# Patient Record
Sex: FEMALE | Race: WHITE
[De-identification: names, ages, dates, MRNs, and addresses within clinical notes are randomized per-mention and may not be internally consistent; named-entity substitution may affect disease eponyms.]

---

## 2020-10-27 ENCOUNTER — HOSPITAL ENCOUNTER (EMERGENCY)
Dept: HOSPITAL 97 - ER | Age: 55
Discharge: HOME | End: 2020-10-27
Payer: COMMERCIAL

## 2020-10-27 VITALS — SYSTOLIC BLOOD PRESSURE: 141 MMHG | TEMPERATURE: 98.5 F | DIASTOLIC BLOOD PRESSURE: 69 MMHG

## 2020-10-27 VITALS — OXYGEN SATURATION: 99 %

## 2020-10-27 DIAGNOSIS — B27.90: Primary | ICD-10-CM

## 2020-10-27 PROCEDURE — 36415 COLL VENOUS BLD VENIPUNCTURE: CPT

## 2020-10-27 PROCEDURE — 86308 HETEROPHILE ANTIBODY SCREEN: CPT

## 2020-10-27 PROCEDURE — 99281 EMR DPT VST MAYX REQ PHY/QHP: CPT

## 2020-10-27 PROCEDURE — 87804 INFLUENZA ASSAY W/OPTIC: CPT

## 2020-10-27 NOTE — XMS REPORT
Clinical Summary

                           Created on:2020



Patient:Fanta Davis

Sex:Female

:1965

External Reference #:[id number]





Demographics







                          Address                   [address] [place] [address]

 

                          Home Phone                [phone]

 

                          Email Address             [email or web address]

 

                          Preferred Language        English

 

                          Marital Status            

 

                          Jain Affiliation     Unknown

 

                          Race                      White

 

                          Ethnic Group              Not  or 









Author







                          Organization              [facility] [employer]

 

                          Address                   [address] [place] [address]









Support







                Name            Relationship    Address         Phone

 

                Taz Davis    Spouse          Unavailable     [phone]









Care Team Providers







                    Name                Role                Phone

 

                    Asked, No Pcp       Primary Care Provider Unavailable









Allergies

Not on File



Medications

No known medications



Active Problems

Not on file



Social History







             Tobacco Use  Types        Packs/Day    Years Used   Date

 

             Never Assessed                                        









                Smokeless Tobacco: Never Used                                 









                          Sex Assigned at Birth     Date Recorded

 

                          Not on file               







Last Filed Vital Signs

Not on file



Plan of Treatment







                Health Maintenance Due Date        Last Done       Comments

 

                CERVICAL CANCER SCREENING 1987                      

 

                BREAST CANCER SCREENING 2016                      

 

                COLONOSCOPY SCREENING 2016                      

 

                SHINGLES VACCINES (#1) 2016                      

 

                INFLUENZA VACCINE 2020                      







Results

Not on fileafter 10/27/2019



Insurance







          Payer     Benefit Plan / Group Subscriber ID Effective Dates Phone    

 Address   Type

 

          [employer]     ALLIED/[employer] SIG ADMIN [id number] 10/2/2018-Present          

           PPO

 

          [employer]     [employer] SIG ADMIN PPO [id number] 2018-Present              

       PPO









           Guarantor Name Account Type Relation to Date of Birth Phone      Bill

ing



                                 Patient                          Address

 

           Fanta Davis Personal/Family Self       1965 [phone] [address]

[facility]



                                                       ([place])     [place] [id number]







Advance Directives

For more information, please contact: [phone]





                Type            Date Recorded   Patient Representative Explanati

on

 

                Advance Directives, Living 2018  5:46 PM                 



                Will and Medical Power of

## 2020-10-27 NOTE — ER
Nurse's Notes                                                                                     

 [facility]                                                                 

Name: Fanta Davis                                                                                

Age: 54 yrs                                                                                       

Sex: Female                                                                                       

: 1965                                                                                   

MRN: [id number]                                                                                   

Arrival Date: 10/27/2020                                                                          

Time: 10:19                                                                                       

Account#: [id number]                                                                            

Bed 15                                                                                            

Private MD:                                                                                       

Diagnosis: Infectious mononucleosis                                                               

                                                                                                  

Presentation:                                                                                     

10/27                                                                                             

10:35 Chief complaint: Patient states: headache, bodyaches, fever last night, daughter        iw  

      diagnosed with mono recently, daughter was covid negative. Coronavirus screen: fever,       

      headache, muscle pain. Ebola Screen: Patient negative for fever greater than or equal       

      to 101.5 degrees Fahrenheit, and additional compatible Ebola Virus Disease symptoms         

      Patient denies exposure to infectious person. Patient denies travel to an                   

      Ebola-affected area in the 21 days before illness onset. No symptoms or risks               

      identified at this time. Initial Sepsis Screen: Does the patient meet any 2 criteria?       

      No. Patient's initial sepsis screen is negative. Does the patient have a suspected          

      source of infection? No. Patient's initial sepsis screen is negative. Risk Assessment:      

      Do you want to hurt yourself or someone else? Patient reports no desire to harm self or     

      others. Onset of symptoms was 2020.                                             

10:35 Method Of Arrival: Ambulatory                                                           iw  

10:35 Acuity: SIMRAN 3                                                                           iw  

                                                                                                  

Triage Assessment:                                                                                

10:40 General: Appears in no apparent distress. uncomfortable, Behavior is cooperative,       bp  

      appropriate for age, anxious. Pain: Complains of pain in SORE THROAT. EENT: Throat is       

      reddened. Neuro: No deficits noted. Cardiovascular: No deficits noted. Respiratory: No      

      deficits noted. GI: No signs and/or symptoms were reported involving the                    

      gastrointestinal system. : No signs and/or symptoms were reported regarding the           

      genitourinary system. Derm: No deficits noted. Musculoskeletal: No deficits noted.          

                                                                                                  

Historical:                                                                                       

- Allergies:                                                                                      

10:36 No Known Allergies;                                                                     iw  

- Home Meds:                                                                                      

10:36 None [Active];                                                                          iw  

- PMHx:                                                                                           

10:36 None;                                                                                   iw  

- PSHx:                                                                                           

10:36 ;                                                                              iw  

                                                                                                  

- Immunization history:: Adult Immunizations not up to date.                                      

- Social history:: Smoking status: Patient denies any tobacco usage or history of.                

                                                                                                  

                                                                                                  

Screening:                                                                                        

10:43 Abuse screen: Denies threats or abuse. Denies injuries from another. Nutritional        bp  

      screening: No deficits noted. Tuberculosis screening: No symptoms or risk factors           

      identified. Fall Risk None identified.                                                      

                                                                                                  

Assessment:                                                                                       

10:40 General: SEE TRIAGE NOTE.                                                               bp  

12:00 Reassessment: PT D/C HOME AMBULATORY WITH FAMILY, DX WITH MONO.                         bp  

                                                                                                  

Vital Signs:                                                                                      

10:35  / 75; Pulse 71; Resp 16; Temp 98.8; Pulse Ox 99% on R/A; Weight 81.65 kg; Height iw  

      5 ft. 2 in. (157.48 cm);                                                                    

12:00  / 69; Pulse 75; Resp 17; Temp 98.5; Pulse Ox 99% ;                               bp  

10:35 Body Mass Index 32.92 (81.65 kg, 157.48 cm)                                             iw  

                                                                                                  

ED Course:                                                                                        

10:19 Patient arrived in ED.                                                                  ag5 

10:21 [facility]                                                        kb  

10:21 Chicho Glasgow MD is Attending Physician.                                             kb  

10:36 Triage completed.                                                                       iw  

10:36 Arm band placed on.                                                                     iw  

10:41 Fred Germain, RN is Primary Nurse.                                                    bp  

10:43 Patient has correct armband on for positive identification. Bed in low position. Call   bp  

      light in reach. Side rails up X2. Adult w/ patient.                                         

12:11 No provider procedures requiring assistance completed. Patient did not have IV access   bp  

      during this emergency room visit.                                                           

                                                                                                  

Administered Medications:                                                                         

No medications were administered                                                                  

                                                                                                  

                                                                                                  

Outcome:                                                                                          

11:56 Discharge ordered by MD.                                                                kb  

12:11 Discharged to home ambulatory, with family.                                             bp  

12:11 Condition: stable                                                                           

12:11 Discharge instructions given to patient, Instructed on discharge instructions, follow       

      up and referral plans. Demonstrated understanding of instructions, follow-up care.          

12:11 Patient left the ED.                                                                    bp  

                                                                                                  

Signatures:                                                                                       

Mary Worley FNP-C FNP-Morena Rodriguez, RN                     RN   iw                                                   

Fred Germain, RN                      RN   Albert Red                                ag5                                                  

                                                                                                  

Corrections: (The following items were deleted from the chart)                                    

10:40 10:35 Chief complaint: Patient states: headache, bodyaches, fever last night, daughter  iw  

      diagnosed with mono recently iw                                                             

                                                                                                  

**************************************************************************************************

## 2020-10-27 NOTE — XMS REPORT
Summary of Care

                           Created on:2020



Patient:Fanta Davis

Sex:Female

:1965

External Reference #:[id number]





Demographics







                          Address                   [address] [place] [address]

 

                          Mobile Phone              [phone]

 

                          Home Phone                [phone]

 

                          Email Address             [email or web address]

 

                          Preferred Language        English

 

                          Marital Status            

 

                          Mandaen Affiliation     Unknown

 

                          Race                      White

 

                          Ethnic Group              Not  or 









Author







                          Organization              [facility]

 

                          Address                   [address] [place] [address]









Support







                Name            Relationship    Address         Phone

 

                Taz Davis    Unavailable     Unavailable     [phone]









Care Team Providers







                    Name                Role                Phone

 

                    GILBERTO Gimenez          Primary Care Provider [phone]









Reason for Referral

 (Routine)





           Status     Reason     Specialty  Diagnoses / Referred By Referred To



                                            Procedures Contact    Contact

 

                New Request                     Family Medicine Diagnoses



Encounter for well woman exam with routine gynecological exam



Elevated BP without diagnosis of hypertension Glory Blanco,           Bao

g,



                                                                



Procedures



CONSULT/REFERRAL FAMILY MEDICINE



CONSULT/REFERRAL FAMILY MEDICINE        MD Gil MD







                                                       [address] [place] [address]



                                                                 Dr. Dr Camarillo 



                                        [facility] 







                                                       [place] [id number]







                                                       Phone:     Phone:



                                                                 [phone]







                                                       Fax:       Fax:



                                                       [phone]





 (Routine)





           Status     Reason     Specialty  Diagnoses / Referred By Referred To



                                            Procedures Contact    Contact

 

                New Request                                     Diagnoses



Encounter for screening colonoscopy     Glory Blanco MD Humphrey, Laurel,



                                                                



Procedures



CONSULT/REFERRAL GENERAL SURGERY [address] [place] [address]           MD Vallejo



                                        [facility] 



                                        [facility] [address]







                                                                 [address]



                                        [place], [place]



                                                       Phone:     [phone]



                                        Phone:



                                                       Fax: [phone]







                                                                  Fax: [phone]





Radiology Services (Routine)





           Status     Reason     Specialty  Diagnoses / Referred By Referred To



                                            Procedures Contact    Contact

 

                New Request                     Diagnostic      Diagnoses



Encounter for screening mammogram for malignant neoplasm of breast



Encounter for well woman exam with routine gynecological exam Glory Blanco,   

        



                                                Radiology       



Procedures



BI SCREENING MAMMOGRAM BILATERAL        MD



                                        



                                                       [address] [place] [address] 



                                                                 Dr. Camarillo 



                                        



                                                       [place] 



                                                                 [address]



                                        



                                                       Phone:     



                                                                 [phone]



                                        



                                                       Fax:       



                                                       [phone] 









Reason for Visit







                          Reason                    Comments

 

                          Well Woman Exam           







Encounter Details







             Date         Type         Department   Care Team    Description

 

                10/07/2020      Office Visit    [facility] Women's AdumGlory MD



                                        Encounter for well woman exam with damien gonzalez gynecological exam (Primary Dx);



                                                            [address]           Encounter for screening mamm

ogram for malignant neoplasm of 

breast;



                                                [address] [place] [address] DrLoree



                                        Encounter for screening colonoscopy;



                                                            Drive, [address]



                                        [address]



                                        Elevated BP without diagnosis of hyperte

nsion



                                       [place] 



                                                            [address]



                                        [id number] [phone] (Fax) 







Allergies

No Known Allergiesdocumented as of this encounter (statuses as of 10/08/2020)



Medications







          Medication Sig       Dispensed Refills   Start Date End Date  Status

 

          azelastine 137 mcg Use 1 Spray in each 30 mL     0         2020 

          Active



          (0.1 %) nasal nostril 2 (two)                                         



          sprayIndications: times daily. Use in                                 

        



          Sore throat, each nostril as                                         



          Post-nasal drip, directed                                          



          Cough                                                       



documented as of this encounter (statuses as of 10/08/2020)



Active Problems







                          Problem                   Noted Date

 

                          Sore throat               2020

 

                          Post-nasal drip           2020

 

                          Cough                     2020

 

                          Obesity (BMI 30-39.9)     2018

 

                          Family history of early CAD 2018



documented as of this encounter (statuses as of 10/08/2020)



Resolved Problems







                    Problem             Noted Date          Resolved Date

 

                    Chest pain          2018



documented as of this encounter (statuses as of 10/08/2020)



Social History







             Tobacco Use  Types        Packs/Day    Years Used   Date

 

             Never Smoker                                        









                Smokeless Tobacco: Never Used                                 









                Alcohol Use     Drinks/Week     oz/Week         Comments

 

                Yes                                             









                    Alcohol Habits      Answer              Date Recorded

 

                    How often do you have a drink containing alcohol? Monthly or

 less     10/07/2020

 

                    How many drinks containing alcohol do you have on a Not aske

d           10/07/2020



                    typical day when you are drinking?                     

 

                    How often do you have six or more drinks on one Not asked   

        10/07/2020



                    occasion?                               









                          Sex Assigned at Birth     Date Recorded

 

                          Not on file               









                    COVID-19 Exposure   Response            Date Recorded

 

                    In the last month, have you been in contact with No / Unsure

         10/7/2020  2:59 PM 

CDT



                    someone who was confirmed or suspected to have              

       



                    Coronavirus / COVID-19?                     



documented as of this encounter



Last Filed Vital Signs







                Vital Sign      Reading         Time Taken      Comments

 

                Blood Pressure  148/77          10/07/2020  3:35 PM CDT 

 

                Pulse           66              10/07/2020  3:35 PM CDT 

 

                Temperature     36.8 C (98.3 F) 10/07/2020  3:35 PM CDT 

 

                Respiratory Rate 18              10/07/2020  3:35 PM CDT 

 

                Oxygen Saturation -               -               

 

                Inhaled Oxygen Concentration -               -               

 

                Weight          82.8 kg (182 lb 9.6 oz) 10/07/2020  3:35 PM CDT 

 

                Height          157.5 cm (5' 2") 10/07/2020  3:35 PM CDT 

 

                Body Mass Index 33.4            10/07/2020  3:35 PM CDT 



documented in this encounter



Progress Notes

Glory Blanco MD - 10/07/2020  3:30 PM CDTFormatting of this note might be 
different from the original.

Chief complaint:

Chief Complaint

Patient presents with

 Well Woman Exam



Hope Olga Davis is a 54 year-old  who presents for WWE.

She has no concerns today but would like to make sure that the cervical polyp 
that was removed by Tanya in 2018 has not recurred. She reports regular 
cycles, denies intermenstrual or post coital bleeding

She is , denies domestic or immediate partner abuse. She declined STI 
screening. BC is a BTL

Last pap smear and mammogram were in 2018, she denies history of abnormal 
results. She has never hada colonoscopy.

Denies family history of breast, ovarian, endometrial or colon cancer

She declined flu shot



Histories

OB History

 Para Term  AB Living

8 7     1 7

SAB TAB Ectopic Multiple Live Births

        7



# Outcome Date GA Lbr David/2nd Weight Sex Delivery Anes PTL Lv

8 AB

7 Para

6 Para

5 Para

4 Para

3 Para

2 Para

1 Para



Obstetric Comments

4 vaginal deliveries

3 C-Sections

1st C -Section was due to Breech

One Miscarriage



Past Medical History:

Diagnosis Date

 Abnormal uterine bleeding

 Anemia

 Breast disorder 2018

 Left breast Usg / Negative

 Cervical polyp

 Heart murmur

 Dr Medrano noted

 Hypertension

 Urinary incontinence



Family History

Problem Relation Age of Onset

 Coronary Heart Disease Mother

 Stroke Mother

 Coronary Heart Disease Brother

 Aneurysm Father

 Arthritis NoFHx

 Asthma NoFHx

 Birth defects NoFHx

 Breast Cancer NoFHx

 Colon Cancer NoFHx

 Ovarian Cancer NoFHx

 Uterine Cancer NoFHx

 Cancer NoFHx

 Depression NoFHx

 Diabetes NoFHx

 Genetic NoFHx

 Heart NoFHx

 High cholesterol NoFHx

 Hypertension NoFHx

 Mental retardation NoFHx

 Neurological NoFHx

 Osteoporosis NoFHx

 Psychiatry NoFHx

 Other - see comments NoFHx



Family Status

Relation Name Status

 Mo  

 Bro  (Not Specified)

 Fa  

 NoFHx  (Not Specified)



Past Surgical History:

Procedure Laterality Date

  SECTION

 x'3

 TUBAL LIGATION

 x's 2



Social History



Socioeconomic History

 Marital status: 

  Spouse name: Not on file

 Number of children: Not on file

 Years of education: Not on file

 Highest education level: Not on file

Occupational History

 Not on file

Social Needs

 Financial resource strain: Not on file

 Food insecurity

  Worry: Not on file

  Inability: Not on file

 Transportation needs

  Medical: Not on file

  Non-medical: Not on file

Tobacco Use

 Smoking status: Never Smoker

 Smokeless tobacco: Never Used

Substance and Sexual Activity

 Alcohol use: Yes

  Frequency: Monthly or less

 Drug use: No

 Sexual activity: Yes

  Partners: Male

  Birth control/protection: Surgical

  Comment: BTL

Lifestyle

 Physical activity

  Days per week: Not on file

  Minutes per session: Not on file

 Stress: Not on file

Relationships

 Social connections

  Talks on phone: Not on file

  Gets together: Not on file

  Attends Anabaptism service: Not on file

  Active member of club or organization: Not on file

  Attends meetings of clubs or organizations: Not on file

  Relationship status: Not on file

 Intimate partner violence

  Fear of current or ex partner: Not on file

  Emotionally abused: Not on file

  Physically abused: Not on file

  Forced sexual activity: Not on file

Other Topics Concern

 Not on file

Social History Narrative

 Lives at home with 

 Denied domestic or physical violence w/i the home

 Mandaen Preference: Oriental orthodox



Social History



Substance and Sexual Activity

Sexual Activity Yes

 Partners: Male

 Birth control/protection: Surgical

 Comment: BTL





Labs

No new labs



Radiology

No new radiology.



Allergies

Hope has No Known Allergies.



Medications

Hope has a current medication list which includes the following prescription(s):
azelastine.



Review of Systems

Constitutional: Negative.

HENT: Negative.

Eyes: Negative.

Respiratory: Negative.

Breasts: Negative.

Cardiovascular: Negative.

Gastrointestinal: Negative.

Genitourinary: Negative.

Musculoskeletal: Negative.

Skin: Negative.

Neurological: Negative.

Psychiatric/Behavioral: Negative.

Endocrine: Endocrine negative



BP (!) 148/77 (BP Location: Left arm, Patient Position: Sitting, BP CUFF SIZE: 
Adult Medium)  | Pulse 66  | Temp 36.8 C (98.3 F) (Oral)  | Resp 18  | Ht 5'
2" (1.575 m)  | Wt 182 lb 9.6 oz (82.8 kg)  | LMP 09/10/2020 (Approximate)  | 
BMI 33.40 kg/m

Pregravid BMI: Could not be calculated



Physical Exam

Vitals reviewed.

Constitutional: She is oriented to person, place, and time. She appears well-
developed and well-groomed.

Neck: No tenderness and no mass. No thyroid nodules palpated. No neck 
adenopathy.

Cardiovascular: Regular rate and rhythm. No murmur auscultated. No peripheral 
edema present.

Pulmonary/Chest: Breath sounds clear to auscultation. Normal inspiratory effort.

Abdominal: Abdomen is soft. No mass palpated. No tenderness present. There is no
rigidity and no guarding.

Neuro/Psychiatric: She has a normal mood and affect. She is oriented to person, 
place, and time.

Skin: Skin normal.

Lymphadenopathy: No neck adenopathy present. No axillary adenopathy present. No 
inguinal adenopathy present.



Breast: Right breast exhibits no mass, no nipple discharge and no tenderness. 
Left breast exhibits no mass, no nipple discharge and no tenderness.

External genitalia: Normal external genitalia appropriate for age.

Vagina:No lesion inspected. Normal support. No abnormal vaginal discharge found.
   No lesions in the vagina.



Cervix: No lesion. No tenderness and no discharge present.

Uterus: Uterus is normal size, normal contour, normal position and non-tender.

Adnexa: Right adnexa without tenderness, ovary enlargement or mass. Left adnexa 
without tenderness, ovary enlargement or mass.







Assessment/Plan

Encounter for well woman exam with routine gynecological exam  (primary 
encounter diagnosis)

Comment: Reviewed and encouraged good nutrition, regular exercise, use of 
sunscreen, awareness of her breasts, routine annuals and mammograms. Also age 
appropriate vaccinations and screening labs were discussed. Colonoscopy after 
age of 51 and DEXA scan 66 years of age. Bone health-adequate Vit D and calcium 
with weight bearing exercise. Expectation and changes during perimenopausal 
stage were discussed and she was encouraged to call with any concerns.

Plan: BI SCREENING MAMMOGRAM BILATERAL, PAP Smear-Liquid Based, HIGH RISK HPV-
THIN PREP, PAP Smear-Liquid Based, HIGH RISK HPV-THIN PREP

Encounter for screening mammogram for malignant neoplasm of breast

Plan: BI SCREENING MAMMOGRAM BILATERAL



Encounter for screening colonoscopy

Comment:Reviewed the options for colon cancer screening. sigmoidoscopy every 5 
years, colonoscopy (gold standard) every 10 years, FOBT yearly, stool DNA. 
Patient accepted colonoscopy and referral generated

Plan: CONSULT/REFERRAL GENERAL SURGERY



Elevated BP without diagnosis of hypertension

Patient reports that she is aware of her elevated BP- it is intermittent and she
keeps an eye on it but hasn't been formally diagnosed with hypertension. She 
hasn't seen her PCP in years and would to establish care with one here. Her 
 see Dr Mooney. Referral generated.

Stressed and encouraged lifestyle modification, - change in diet, with regular 
exercise



This visit did not involve counseling and coordination that comprised more than 
50% of the visit time.

Glory Blanco MD



Electronically signed by Glory Blanco MD at 10/08/2020 12:37 PM CDTdocumented
in this encounter



Plan of Treatment







             Date         Type         Specialty    Care Team    Description

 

                2020      Office Visit    Family Medicine Gil Mooney MD



                                        



                                                                [address] [place] [address] 

Dr Camarillo [address] [place] [address] 77

15



                                        



                                                                [phone] (Fax) 

 

                10/13/2021      Office Visit    Obstetrics & Gynecology Mariola Blanco MD



                                        



                                                                [address] [place] [address] 

Dr. Camarillo [address] [place] [address] 775

 [phone] (Fax) 









             Name         Type         Priority     Associated Diagnoses Date/Ti

me

 

             HIGH RISK HPV-THIN LAB          Routine      Encounter for well wom

an 10/07/2020  4:49 PM CDT



             PREP                                   exam with routine 



                                                    gynecological exam 

 

             LAB ONLY PAP LAB          Routine      Encounter for well woman 10/

2020  4:49 PM CDT



             SMEAR-LIQUID BASED                           exam with routine 



                                                    gynecological exam 









             Name         Type         Priority     Associated Diagnoses Order S

chedule

 

             BI SCREENING MAMMOGRAM IMAGING      Routine      Encounter for scre

ening Expected:



             BILATERAL                              mammogram for malignant 10/0

2020, Expires:



                                                                neoplasm of flory

st



                                        2021



                                                    Encounter for well woman 



                                                    exam with routine 



                                                    gynecological exam 

 

             HIGH RISK HPV-THIN LAB          Routine      Encounter for well wom

an Expected:



             PREP                                   exam with routine 10/07/2020

, Expires:



                                                    gynecological exam 10/07/202

1









                Health Maintenance Due Date        Last Done       Comments

 

                Depression Screening 1978                      

 

                DTaP,Tdap,and Td Vaccines (1 - 1985                      



                Tdap)                                           

 

                PAP SMEAR       1987                      

 

                Breast Cancer Screening (MAMMOGRAM) 2006                  

    

 

                COLON CANCER SCREENING ANNUAL 2016                      



                FIT/FOBT                                        

 

                COLON CANCER SCREENING FIT DNA 2016                      



                EVERY 3 YEARS                                   

 

                COLON CANCER SCREENING 2016                      



                SIGMOIDOSCOPY EVERY 5 YEARS                                 

 

                COLONOSCOPY     2016                      

 

                Colorectal Cancer Screening 2016                      

 

                Zoster Recombinant Vaccine 2016                      



                (SHINGRIX) (1 of 2)                                 

 

                INFLUENZA VACCINE (#1) 2020                      

 

                PNEUMOCOCCAL 0-64 YEARS COMBINED Aged Out                       

 No longer eligible based on



                SERIES                                          patient's age to

 complete



                                                                this topic



documented as of this encounter



Procedures







             Procedure Name Priority     Date/Time    Associated Diagnosis Comme

nts

 

             PAP SMEAR-LIQUID Routine      10/07/2020  4:49 PM Encounter for wel

l woman 



             BASED-CP                  CDT          exam with routine 



                                                    gynecological exam 



documented in this encounter



Results

PAP Smear-Liquid Based (10/07/2020  4:49 PM CDT)





                                        Specimen

 

                                        Swab - CERVIX









                Performing Organization Address         City/State/Zipcode Phone

 Number

 

                          [facility]  CLIA:  [id number]



                          [place] [address] [phone]



                                [address] [place] [address]                 



documented in this encounter



Visit Diagnoses







                                        Diagnosis

 

                                        Encounter for well woman exam with damien gonzalez gynecological exam - Primary

 

                                        Encounter for screening mammogram for ma

lignant neoplasm of breast







                                        Other screening mammogram

 

                                        Encounter for screening colonoscopy







                                        Special screening for malignant neoplasm

s, colon

 

                                        Elevated BP without diagnosis of hyperte

nsion



documented in this encounter



Insurance







          Payer     Benefit Plan / Subscriber ID Effective Dates Phone     Addre

ss   Type



                    Group                                             

 

          [employer] [id number] 2020-Presen                     

O/PPO/PO



          NON-CONTRACT NON-CONTRACT           t                             S



          GENERIC   GENERIC                                           









           Guarantor Name Account Type Relation to Date of    Phone      Billing



                                 Patient    Birth                 Address

 

           Fanta Davis Personal/Family Self       1965 [phone] [address] [facility]                                      (Home)     [place]



                                                                  [id number]



documented as of this encounter

## 2020-10-27 NOTE — XMS REPORT
Continuity of Care Document

                           Created on:2020



Patient:OLIVIA MILLER

Sex:Female

:1965

External Reference #:[id number]





Demographics







                          Address                   [address]



                                                    [place] [address]

 

                          Home Phone                [phone]

 

                          Work Phone                [phone]

 

                          Mobile Phone              [phone]

 

                          Email Address             [email or web address]

 

                          Preferred Language        English

 

                          Marital Status            Unknown

 

                          Jain Affiliation     Unknown

 

                          Race                      Unknown

 

                          Additional Race(s)        Unavailable



                                                    White

 

                          Ethnic Group              Not  or 









Author







                          Organization              [facility]

t

 

                          Address                   [address]



                                                    [place] [address]

 

                          Phone                     [phone]









Support







                Name            Relationship    Address         Phone

 

                Ania         Spouse          Unavailable     [phone]

 

                Mason          Spouse          Unavailable     [phone]

 

                Eufemia          Mother          Unavailable     Unavailable









Care Team Providers







                    Name                Role                Phone

 

                    Asked,  Pcp         Primary Care Physician Unavailable

 

                    Bella GUZMAN  L         Attending Clinician [phone]









Problems

This patient has no known problems.



Allergies, Adverse Reactions, Alerts

This patient has no known allergies or adverse reactions.



Social History







           Social Habit Start Date Stop Date  Quantity   Comments   Source

 

           Sex Assigned At                                             [facility]

ethodist



           Birth                                                  

 

           Tobacco use and 2018 Never used            [facility]

ethodist



           exposure   00:00:00   00:00:00                         







Medications

This patient has no known medications.



Procedures

This patient has no known procedures.



Plan of Care







             Planned Activity Planned Date Details      Comments     Source

 

             Future Scheduled 2020   INFLUENZA VACCINE              [place]

n [employer]



             Test         00:00:00     [code = INFLUENZA              



                                       VACCINE]                  

 

             Future Scheduled 2016   BREAST CANCER              [facility]

thodist



             Test         00:00:00     SCREENING [code =              



                                       BREAST CANCER              



                                       SCREENING]                

 

             Future Scheduled 2016   [facility]              [facility]



             Test         00:00:00     [code = COLONOSCOPY              



                                       SCREENING]                

 

             Future Scheduled 2016   SHINGLES VACCINES              [place]

n [employer]



             Test         00:00:00     (#1) [code = SHINGLES              



                                       VACCINES (#1)]              

 

             Future Scheduled 1987   Screening for              [facility]

thodist



             Test         00:00:00     malignant neoplasm of              



                                       cervix (procedure)              



                                       [code = 095785164]              







Encounters







        Start   End     Encounter Admission Attending Care    Care    Encounter 

Source



        Date/Time Date/Time Type    Type    Clinicians Facility Department ID   

   

 

        2020-10-07 2020-10-08 Office          Adum,   [facility]    [id number]

63 



        15:06:36 12:37:09 Visit           Glory Jones [id number]         



                                                Nomi [id number]         



                                                Andra [phone]         



                                                [facility]     [address]             



                                                [facility]                 







Results

This patient has no known results.

## 2020-10-27 NOTE — XMS REPORT
Summary of Care

                           Created on:2020



Patient:Fanta Davis

Sex:Female

:1965

External Reference #:[id number]





Demographics







                          Address                   [address] [place] [address]

 

                          Mobile Phone              [phone]

 

                          Home Phone                [phone]

 

                          Email Address             [email or web address]

 

                          Preferred Language        English

 

                          Marital Status            

 

                          Latter day Affiliation     Unknown

 

                          Race                      White

 

                          Ethnic Group              Not  or 









Author







                          Organization              [facility]

 

                          Address                   [address] [place] [address]









Support







                Name            Relationship    Address         Phone

 

                Taz Davis    Unavailable     Unavailable     [phone]









Care Team Providers







                    Name                Role                Phone

 

                    Pcp,  Does Not Have A Primary Care Provider [phone]









Encounter Details







             Date         Type         Department   Care Team    Description

 

                    10/07/2020          Orders Only         [facility]



                          Doctor Unassigned, No     



                                                            [address] [facility]



                                        Name



                                        



                                                [place] [address] [address] [place] [address] 







Allergies

No Known Allergiesdocumented as of this encounter (statuses as of 10/07/2020)



Medications







          Medication Sig       Dispensed Refills   Start Date End Date  Status

 

          azelastine 137 mcg Use 1 Spray in each 30 mL     0         2020 

          Active



          (0.1 %) nasal nostril 2 (two)                                         



          sprayIndications: times daily. Use in                                 

        



          Sore throat, each nostril as                                         



          Post-nasal drip, directed                                          



          Cough                                                       



documented as of this encounter (statuses as of 10/07/2020)



Active Problems







                          Problem                   Noted Date

 

                          Sore throat               2020

 

                          Post-nasal drip           2020

 

                          Cough                     2020

 

                          Obesity (BMI 30-39.9)     2018

 

                          Family history of early CAD 2018



documented as of this encounter (statuses as of 10/07/2020)



Resolved Problems







                    Problem             Noted Date          Resolved Date

 

                    Chest pain          2018



documented as of this encounter (statuses as of 10/07/2020)



Social History







             Tobacco Use  Types        Packs/Day    Years Used   Date

 

             Never Smoker                                        









                Smokeless Tobacco: Never Used                                 









                Alcohol Use     Drinks/Week     oz/Week         Comments

 

                No                                              









                          Sex Assigned at Birth     Date Recorded

 

                          Not on file               









                    COVID-19 Exposure   Response            Date Recorded

 

                    In the last month, have you been in contact with No / Unsure

         10/7/2020  2:59 PM 

CDT



                    someone who was confirmed or suspected to have              

       



                    Coronavirus / COVID-19?                     



documented as of this encounter



Last Filed Vital Signs

Not on filedocumented in this encounter



Plan of Treatment







             Date         Type         Specialty    Care Team    Description

 

                10/07/2020      Office Visit    Obstetrics & Gynecology Mariola Blanco MD



                                        



                                                                [address] [place] [address] 

Dr. Tavares



                                        



                                                                [place], 

 [phone] (Fax) 









                Health Maintenance Due Date        Last Done       Comments

 

                Depression Screening 1978                      

 

                DTaP,Tdap,and Td Vaccines (1 - 1985                      



                Tdap)                                           

 

                PAP SMEAR       1987                      

 

                Breast Cancer Screening (MAMMOGRAM) 2006                  

    

 

                COLON CANCER SCREENING ANNUAL 2016                      



                FIT/FOBT                                        

 

                COLON CANCER SCREENING FIT DNA 2016                      



                EVERY 3 YEARS                                   

 

                COLON CANCER SCREENING 2016                      



                SIGMOIDOSCOPY EVERY 5 YEARS                                 

 

                COLONOSCOPY     2016                      

 

                Colorectal Cancer Screening 2016                      

 

                Zoster Recombinant Vaccine 2016                      



                (SHINGRIX) (1 of 2)                                 

 

                INFLUENZA VACCINE (#1) 2020                      

 

                PNEUMOCOCCAL 0-64 YEARS COMBINED Aged Out                       

 No longer eligible based on



                [facility]ER[facility]                                          patient's age to

 complete



                                                                this topic



documented as of this encounter



Procedures







             Procedure Name Priority     Date/Time    Associated Diagnosis Comme

nts

 

             ASSIGNMENT OF BENEFITS Routine      10/07/2020  3:05 PM CDT        

      



documented in this encounter



Results

Not on filedocumented in this encounter



Insurance







          Payer     Benefit Plan / Subscriber ID Effective Dates Phone     Addre

ss   Type



                    Group                                             

 

          COMMERCIAL COMMERCIAL [id number] 2020-[employer]                     [employer]

O/PPO/PO



          NON-CONTRACT NON-CONTRACT           t                             S



          GENERIC   GENERIC                                           



documented as of this encounter

## 2020-10-27 NOTE — XMS REPORT
Summary of Care

                           Created on:2020



Patient:Fanta Davis

Sex:Female

:1965

External Reference #:[id number]





Demographics







                          Address                   [address] [place] [address]

 

                          Mobile Phone              [phone]

 

                          Home Phone                [phone]

 

                          Email Address             [email or web address]

 

                          Preferred Language        English

 

                          Marital Status            

 

                          Hinduism Affiliation     Unknown

 

                          Race                      White

 

                          Ethnic Group              Not  or 









Author







                          Organization              [facility]

 

                          Address                   [address] [place] [address]









Support







                Name            Relationship    Address         Phone

 

                Taz Davis    Unavailable     Unavailable     [phone]









Care Team Providers







                    Name                Role                Phone

 

                    GILBERTO Gimenez          Primary Care Provider [phone]









Reason for Referral

 (Routine)





           Status     Reason     Specialty  Diagnoses / Referred By Referred To



                                            Procedures Contact    Contact

 

                New Request                     Family Medicine Diagnoses



Encounter for well woman exam with routine gynecological exam



Elevated BP without diagnosis of hypertension Glory Blanco,           Bao

g,



                                                                



Procedures



CONSULT/REFERRAL FAMILY MEDICINE



CONSULT/REFERRAL FAMILY MEDICINE        MD Gil MD







                                                       [address] [place] [address]



                                                                 Dr. Dr Camarillo 



                                        [facility] 







                                                       [place] [id number]







                                                       Phone:     Phone:



                                                                 [phone]







                                                       Fax:       Fax:



                                                       [phone]





 (Routine)





           Status     Reason     Specialty  Diagnoses / Referred By Referred To



                                            Procedures Contact    Contact

 

                New Request                                     Diagnoses



Encounter for screening colonoscopy     Glory Blanco MD Humphrey, Laurel,



                                                                



Procedures



CONSULT/REFERRAL GENERAL SURGERY [address] [place] [address]           MD Vallejo



                                        [facility] 



                                        [facility] [address]







                                                                 [address]



                                        [place], [place]



                                                       Phone:     [phone]



                                        Phone:



                                                       Fax: [phone]







                                                                  Fax: [phone]





Radiology Services (Routine)





           Status     Reason     Specialty  Diagnoses / Referred By Referred To



                                            Procedures Contact    Contact

 

                New Request                     Diagnostic      Diagnoses



Encounter for screening mammogram for malignant neoplasm of breast



Encounter for well woman exam with routine gynecological exam Glory Blanco,   

        



                                                Radiology       



Procedures



BI SCREENING MAMMOGRAM BILATERAL        MD



                                        



                                                       [address] [place] [address] 



                                                                 Dr. Camarillo 



                                        



                                                       [place] 



                                                                 [address]



                                        



                                                       Phone:     



                                                                 [phone]



                                        



                                                       Fax:       



                                                       [phone] 









Reason for Visit







                          Reason                    Comments

 

                          Well Woman Exam           







Encounter Details







             Date         Type         Department   Care Team    Description

 

                10/07/2020      Office Visit    [facility] Ad[facility]Glory MD



                                        Encounter for well woman exam with damien gonzalez gynecological exam (Primary Dx);



                                                            [address]           Encounter for screening mamm

ogram for malignant neoplasm of 

breast;



                                                [address] [place] [address] DrLoree



                                        Encounter for screening colonoscopy;



                                                            Drive, [address]



                                        [address]



                                        Elevated BP without diagnosis of hyperte

nsion



                                       [place] 



                                                            [address]



                                        [id number] [phone] (Fax) 







Allergies

No Known Allergiesdocumented as of this encounter (statuses as of 10/08/2020)



Medications







          Medication Sig       Dispensed Refills   Start Date End Date  Status

 

          azelastine 137 mcg Use 1 Spray in each 30 mL     0         2020 

          Active



          (0.1 %) nasal nostril 2 (two)                                         



          sprayIndications: times daily. Use in                                 

        



          Sore throat, each nostril as                                         



          Post-nasal drip, directed                                          



          Cough                                                       



documented as of this encounter (statuses as of 10/08/2020)



Active Problems







                          Problem                   Noted Date

 

                          Sore throat               2020

 

                          Post-nasal drip           2020

 

                          Cough                     2020

 

                          Obesity (BMI 30-39.9)     2018

 

                          Family history of early CAD 2018



documented as of this encounter (statuses as of 10/08/2020)



Resolved Problems







                    Problem             Noted Date          Resolved Date

 

                    Chest pain          2018



documented as of this encounter (statuses as of 10/08/2020)



Social History







             Tobacco Use  Types        Packs/Day    Years Used   Date

 

             Never Smoker                                        









                Smokeless Tobacco: Never Used                                 









                Alcohol Use     Drinks/Week     oz/Week         Comments

 

                Yes                                             









                    Alcohol Habits      Answer              Date Recorded

 

                    How often do you have a drink containing alcohol? Monthly or

 less     10/07/2020

 

                    How many drinks containing alcohol do you have on a Not aske

d           10/07/2020



                    typical day when you are drinking?                     

 

                    How often do you have six or more drinks on one Not asked   

        10/07/2020



                    occasion?                               









                          Sex Assigned at Birth     Date Recorded

 

                          Not on file               









                    COVID-19 Exposure   Response            Date Recorded

 

                    In the last month, have you been in contact with No / Unsure

         10/7/2020  2:59 PM 

CDT



                    someone who was confirmed or suspected to have              

       



                    Coronavirus / COVID-19?                     



documented as of this encounter



Last Filed Vital Signs







                Vital Sign      Reading         Time Taken      Comments

 

                Blood Pressure  148/77          10/07/2020  3:35 PM CDT 

 

                Pulse           66              10/07/2020  3:35 PM CDT 

 

                Temperature     36.8 C (98.3 F) 10/07/2020  3:35 PM CDT 

 

                Respiratory Rate 18              10/07/2020  3:35 PM CDT 

 

                Oxygen Saturation -               -               

 

                Inhaled Oxygen Concentration -               -               

 

                Weight          82.8 kg (182 lb 9.6 oz) 10/07/2020  3:35 PM CDT 

 

                Height          157.5 cm (5' 2") 10/07/2020  3:35 PM CDT 

 

                Body Mass Index 33.4            10/07/2020  3:35 PM CDT 



documented in this encounter



Progress Notes

Glory Blanco MD - 10/07/2020  3:30 PM CDTFormatting of this note might be 
different from the original.

Chief complaint:

Chief Complaint

Patient presents with

 Well Woman Exam



Hope Olga Davis is a 54 year-old  who presents for WWE.

She has no concerns today but would like to make sure that the cervical polyp 
that was removed by Tanya in 2018 has not recurred. She reports regular 
cycles, denies intermenstrual or post coital bleeding

She is , denies domestic or immediate partner abuse. She declined STI 
screening. BC is a BTL

Last pap smear and mammogram were in 2018, she denies history of abnormal 
results. She has never hada colonoscopy.

Denies family history of breast, ovarian, endometrial or colon cancer



Histories

OB History

 Para Term  AB Living

8 7     1 7

SAB TAB Ectopic Multiple Live Births

        7



# Outcome Date GA Lbr David/2nd Weight Sex Delivery Anes PTL Lv

8 AB

7 Para

6 Para

5 Para

4 Para

3 Para

2 Para

1 Para



Obstetric Comments

4 vaginal deliveries

3 C-Sections

1st C -Section was due to Breech

One Miscarriage



Past Medical History:

Diagnosis Date

 Abnormal uterine bleeding

 Anemia

 Breast disorder 2018

 Left breast Usg / Negative

 Cervical polyp

 Heart murmur

 Dr Medrano noted

 Hypertension

 Urinary incontinence



Family History

Problem Relation Age of Onset

 Coronary Heart Disease Mother

 Stroke Mother

 Coronary Heart Disease Brother

 Aneurysm Father

 Arthritis NoFHx

 Asthma NoFHx

 Birth defects NoFHx

 Breast Cancer NoFHx

 Colon Cancer NoFHx

 Ovarian Cancer NoFHx

 Uterine Cancer NoFHx

 Cancer NoFHx

 Depression NoFHx

 Diabetes NoFHx

 Genetic NoFHx

 Heart NoFHx

 High cholesterol NoFHx

 Hypertension NoFHx

 Mental retardation NoFHx

 Neurological NoFHx

 Osteoporosis NoFHx

 Psychiatry NoFHx

 Other - see comments NoFHx



Family Status

Relation Name Status

 Mo  

 Bro  (Not Specified)

 Fa  

 NoFHx  (Not Specified)



Past Surgical History:

Procedure Laterality Date

  SECTION

 x'3

 TUBAL LIGATION

 x's 2



Social History



Socioeconomic History

 Marital status: 

  Spouse name: Not on file

 Number of children: Not on file

 Years of education: Not on file

 Highest education level: Not on file

Occupational History

 Not on file

Social Needs

 Financial resource strain: Not on file

 Food insecurity

  Worry: Not on file

  Inability: Not on file

 Transportation needs

  Medical: Not on file

  Non-medical: Not on file

Tobacco Use

 Smoking status: Never Smoker

 Smokeless tobacco: Never Used

Substance and Sexual Activity

 Alcohol use: Yes

  Frequency: Monthly or less

 Drug use: No

 Sexual activity: Yes

  Partners: Male

  Birth control/protection: Surgical

  Comment: BTL

Lifestyle

 Physical activity

  Days per week: Not on file

  Minutes per session: Not on file

 Stress: Not on file

Relationships

 Social connections

  Talks on phone: Not on file

  Gets together: Not on file

  Attends [employer] service: Not on file

  Active member of club or organization: Not on file

  Attends meetings of clubs or organizations: Not on file

  Relationship status: Not on file

 Intimate partner violence

  Fear of current or ex partner: Not on file

  Emotionally abused: Not on file

  Physically abused: Not on file

  Forced sexual activity: Not on file

Other Topics Concern

 Not on file

Social History Narrative

 Lives at home with 

 Denied domestic or physical violence w/i the home

 Hinduism Preference: Anabaptism



Social History



Substance and Sexual Activity

Sexual Activity Yes

 Partners: Male

 Birth control/protection: Surgical

 Comment: BTL





Labs

No new labs



Radiology

No new radiology.



Allergies

Hope has No Known Allergies.



Medications

Hope has a current medication list which includes the following prescription(s):
azelastine.



Review of Systems

Constitutional: Negative.

HENT: Negative.

Eyes: Negative.

Respiratory: Negative.

Breasts: Negative.

Cardiovascular: Negative.

Gastrointestinal: Negative.

Genitourinary: Negative.

Musculoskeletal: Negative.

Skin: Negative.

Neurological: Negative.

Psychiatric/Behavioral: Negative.

Endocrine: Endocrine negative



BP (!) 148/77 (BP Location: Left arm, Patient Position: Sitting, BP CUFF SIZE: 
Adult Medium)  | Pulse 66  | Temp 36.8 C (98.3 F) (Oral)  | Resp 18  | Ht 5'
2" (1.575 m)  | Wt 182 lb 9.6 oz (82.8 kg)  | LMP 09/10/2020 (Approximate)  | 
BMI 33.40 kg/m

Pregravid BMI: Could not be calculated



Physical Exam

Vitals reviewed.

Constitutional: She is oriented to person, place, and time. She appears well-
developed and well-groomed.

Neck: No tenderness and no mass. No thyroid nodules palpated. No neck 
adenopathy.

Cardiovascular: Regular rate and rhythm. No murmur auscultated. No peripheral 
edema present.

Pulmonary/Chest: Breath sounds clear to auscultation. Normal inspiratory effort.

Abdominal: Abdomen is soft. No mass palpated. No tenderness present. There is no
rigidity and no guarding.

Neuro/Psychiatric: She has a normal mood and affect. She is oriented to person, 
place, and time.

Skin: Skin normal.

Lymphadenopathy: No neck adenopathy present. No axillary adenopathy present. No 
inguinal adenopathy present.



Breast: Right breast exhibits no mass, no nipple discharge and no tenderness. 
Left breast exhibits no mass, no nipple discharge and no tenderness.

External genitalia: Normal external genitalia appropriate for age.

Vagina:No lesion inspected. Normal support. No abnormal vaginal discharge found.
   No lesions in the vagina.



Cervix: No lesion. No tenderness and no discharge present.

Uterus: Uterus is normal size, normal contour, normal position and non-tender.

Adnexa: Right adnexa without tenderness, ovary enlargement or mass. Left adnexa 
without tenderness, ovary enlargement or mass.







Assessment/Plan

Encounter for well woman exam with routine gynecological exam  (primary 
encounter diagnosis)

Comment: Reviewed and encouraged good nutrition, regular exercise, use of 
sunscreen, awareness of her breasts, routine annuals and mammograms. Also age 
appropriate vaccinations and screening labs were discussed. Colonoscopy after 
age of 51 and DEXA scan 66 years of age. Bone health-adequate Vit D and calcium 
with weight bearing exercise. Expectation and changes during perimenopausal 
stage were discussed and she was encouraged to call with any concerns.

Plan: BI SCREENING MAMMOGRAM BILATERAL, PAP Smear-Liquid Based, HIGH RISK HPV-
THIN PREP, PAP Smear-Liquid Based, HIGH RISK HPV-THIN PREP

Encounter for screening mammogram for malignant neoplasm of breast

Plan: BI SCREENING MAMMOGRAM BILATERAL



Encounter for screening colonoscopy

Comment:Reviewed the options for colon cancer screening. sigmoidoscopy every 5 
years, colonoscopy (gold standard) every 10 years, FOBT yearly, stool DNA. 
Patient accepted colonoscopy and referral generated

Plan: CONSULT/REFERRAL GENERAL SURGERY



Elevated BP without diagnosis of hypertension

Patient reports that she is aware of her elevated BP- it is intermittent and she
keeps an eye on it but hasn't been formally diagnosed with hypertension. She 
hasn't seen her PCP in years and would to establish care with one here. Her 
 see Dr Mooney. Referral generated.

Stressed and encouraged lifestyle modification, - change in diet, with regular 
exercise



This visit did not involve counseling and coordination that comprised more than 
50% of the visit time.

Glory Blanco MD



Electronically signed by Glory Blanco MD at 10/08/2020 10:31 AM CDTdocumented
in this encounter



Plan of Treatment







             Date         Type         Specialty    Care Team    Description

 

                2020      Office Visit    Family Medicine Gil Mooney MD



                                        



                                                                [address] [place] [address] 

Dr Camarillo [address] [place] [address]

15



                                        



                                                                [phone] (Fax) 

 

                10/13/2021      Office Visit    Obstetrics & Gynecology Mariola Blanco MD



                                        



                                                                [address] [place] [address] 

Dr. Camarillo [address] [place] [address]

 [phone] (Fax) 









             Name         Type         Priority     Associated Diagnoses Date/Ti

me

 

             HIGH RISK HPV-THIN LAB          Routine      Encounter for well wom

an 10/07/2020  4:49 PM CDT



             PREP                                   exam with routine 



                                                    gynecological exam 

 

             LAB ONLY PAP LAB          Routine      Encounter for well woman 10/

2020  4:49 PM CDT



             SMEAR-LIQUID BASED                           exam with routine 



                                                    gynecological exam 









             Name         Type         Priority     Associated Diagnoses Order S

chedule

 

             BI SCREENING MAMMOGRAM IMAGING      Routine      Encounter for scre

ening Expected:



             BILATERAL                              mammogram for malignant 10/0

2020, Expires:



                                                                neoplasm of flory

st



                                        2021



                                                    Encounter for well woman 



                                                    exam with routine 



                                                    gynecological exam 

 

             HIGH RISK HPV-THIN LAB          Routine      Encounter for well wom

an Expected:



             PREP                                   exam with routine 10/07/2020

, Expires:



                                                    gynecological exam 10/07/202

1









                Health Maintenance Due Date        Last Done       Comments

 

                Depression Screening 1978                      

 

                DTaP,Tdap,and Td Vaccines (1 - 1985                      



                Tdap)                                           

 

                PAP SMEAR       1987                      

 

                Breast Cancer Screening (MAMMOGRAM) 2006                  

    

 

                COLON CANCER SCREENING ANNUAL 2016                      



                FIT/FOBT                                        

 

                COLON CANCER SCREENING FIT DNA 2016                      



                EVERY 3 YEARS                                   

 

                COLON CANCER SCREENING 2016                      



                SIGMOIDOSCOPY EVERY 5 YEARS                                 

 

                COLONOSCOPY     2016                      

 

                Colorectal Cancer Screening 2016                      

 

                Zoster Recombinant Vaccine 2016                      



                (SHINGRIX) (1 of 2)                                 

 

                INFLUENZA VACCINE (#1) 2020                      

 

                PNEUMOCOCCAL 0-64 YEARS COMBINED Aged Out                       

 No longer eligible based on



                SERIES                                          patient's age to

 complete



                                                                this topic



documented as of this encounter



Procedures







             Procedure Name Priority     Date/Time    Associated Diagnosis Comme

nts

 

             PAP SMEAR-LIQUID Routine      10/07/2020  4:49 PM Encounter for wel

l woman 



             BASED-CP                  CDT          exam with routine 



                                                    gynecological exam 



documented in this encounter



Results

PAP Smear-Liquid Based (10/07/2020  4:49 PM CDT)





                                        Specimen

 

                                        Swab - CERVIX









                Performing Organization Address         City/State/Zipcode Phone

 Number

 

                          [facility]  CLIA:  [id number]



                          [place] [address] [phone]



                                [address] [place] [address]                 



documented in this encounter



Visit Diagnoses







                                        Diagnosis

 

                                        Encounter for well woman exam with damien gonzalez gynecological exam - Primary

 

                                        Encounter for screening mammogram for ma

lignant neoplasm of breast







                                        Other screening mammogram

 

                                        Encounter for screening colonoscopy







                                        Special screening for malignant neoplasm

s, colon

 

                                        Elevated BP without diagnosis of hyperte

nsion



documented in this encounter



Insurance







          Payer     Benefit Plan / Subscriber ID Effective Dates Phone     Addre

ss   Type



                    Group                                             

 

          [employer]CIAL [employer]ERCIAL [id number] 2020-Presen                     

O/PPO/PO



          NON-CONTRACT NON-CONTRACT           t                             S



          GENERIC   GENERIC                                           









           Guarantor Name Account Type Relation to Date of    Phone      Billing



                                 Patient    Birth                 Address

 

           Fanta Davis Personal/Family Self       1965 [phone] [address] [facility]                                      (Home)     [place]



                                                                  [id number]



documented as of this encounter

## 2020-10-27 NOTE — EDPHYS
Physician Documentation                                                                           

 [facility]                                                                 

Name: Fanta Davis                                                                                

Age: 54 yrs                                                                                       

Sex: Female                                                                                       

: 1965                                                                                   

MRN: [id number]                                                                                   

Arrival Date: 10/27/2020                                                                          

Time: 10:19                                                                                       

Account#: [id number]                                                                            

Bed 15                                                                                            

Private MD:                                                                                       

TAYLER Physician Chicho Glasgow                                                                      

HPI:                                                                                              

10/27                                                                                             

11:07 This 54 yrs old  Female presents to ER via Ambulatory with complaints of Flu   kb  

      Symptoms.                                                                                   

11:07 The patient or guardian reports flu symptoms, low-grade fever, myalgias. Onset: The     kb  

      symptoms/episode began/occurred last night. Severity of symptoms: At their worst the        

      symptoms were mild, moderate, in the emergency department the symptoms are unchanged.       

      Modifying factors: The symptoms are alleviated by nothing, the symptoms are aggravated      

      by nothing. Associated signs and symptoms: The patient has no apparent associated signs     

      or symptoms. The patient has not experienced similar symptoms in the past, but family       

      has similar symptoms. The patient has not recently seen a physician. Pt reports body        

      aches, fever, malaise and headache that started last night. States she has been around      

      her daughter a lot that was recently diagnosed with mono. States she drank after her        

      daughter. .                                                                                 

                                                                                                  

Historical:                                                                                       

- Allergies:                                                                                      

10:36 No Known Allergies;                                                                     iw  

- Home Meds:                                                                                      

10:36 None [Active];                                                                          iw  

- PMHx:                                                                                           

10:36 None;                                                                                   iw  

- PSHx:                                                                                           

10:36 ;                                                                              iw  

                                                                                                  

- Immunization history:: Adult Immunizations not up to date.                                      

- Social history:: Smoking status: Patient denies any tobacco usage or history of.                

                                                                                                  

                                                                                                  

ROS:                                                                                              

11:07 Cardiovascular: Negative for chest pain, palpitations, and edema, Respiratory: Negative kb  

      for shortness of breath, cough, wheezing, and pleuritic chest pain, Abdomen/GI:             

      Negative for abdominal pain, nausea, vomiting, diarrhea, and constipation,                  

      MS/Extremity: Negative for injury and deformity, Skin: Negative for injury, rash, and       

      discoloration.                                                                              

11:07 Constitutional: Positive for body aches, fever, malaise.                                    

11:07 Neuro: Positive for headache.                                                               

                                                                                                  

Exam:                                                                                             

11:07 Constitutional:  This is a well developed, well nourished patient who is awake, alert,  kb  

      and in no acute distress. Head/Face:  Normocephalic, atraumatic. Chest/axilla:  Normal      

      chest wall appearance and motion.  Nontender with no deformity.  No lesions are             

      appreciated. Cardiovascular:  Regular rate and rhythm with a normal S1 and S2.  No          

      gallops, murmurs, or rubs.  Normal PMI, no JVD.  No pulse deficits. Respiratory:  Lungs     

      have equal breath sounds bilaterally, clear to auscultation and percussion.  No rales,      

      rhonchi or wheezes noted.  No increased work of breathing, no retractions or nasal          

      flaring. Abdomen/GI:  Soft, non-tender, with normal bowel sounds.  No distension or         

      tympany.  No guarding or rebound.  No evidence of tenderness throughout. Skin:  Warm,       

      dry with normal turgor.  Normal color with no rashes, no lesions, and no evidence of        

      cellulitis. MS/ Extremity:  Pulses equal, no cyanosis.  Neurovascular intact.  Full,        

      normal range of motion. Neuro:  Awake and alert, GCS 15, oriented to person, place,         

      time, and situation.  Cranial nerves II-XII grossly intact.  Motor strength 5/5 in all      

      extremities.  Sensory grossly intact.  Cerebellar exam normal.  Normal gait.                

                                                                                                  

Vital Signs:                                                                                      

10:35  / 75; Pulse 71; Resp 16; Temp 98.8; Pulse Ox 99% on R/A; Weight 81.65 kg; Height iw  

      5 ft. 2 in. (157.48 cm);                                                                    

12:00  / 69; Pulse 75; Resp 17; Temp 98.5; Pulse Ox 99% ;                               bp  

10:35 Body Mass Index 32.92 (81.65 kg, 157.48 cm)                                             iw  

                                                                                                  

MDM:                                                                                              

10:37 Patient medically screened.                                                             kb  

11:06 Data reviewed: vital signs, nurses notes. Data interpreted: Pulse oximetry: on room air kb  

      is 99 %. Interpretation: normal. Counseling: I had a detailed discussion with the           

      patient and/or guardian regarding: the historical points, exam findings, and any            

      diagnostic results supporting the discharge/admit diagnosis, lab results, the need for      

      outpatient follow up, a family practitioner, to return to the emergency department if       

      symptoms worsen or persist or if there are any questions or concerns that arise at home.    

11:59 ED course: Lab says it will be another 30 minutes before flu test is resulted. Pt       kb  

      informed. I will call if test is positive.                                                  

                                                                                                  

10/27                                                                                             

10:38 Order name: Mono Screen Profile                                                         kb  

10/27                                                                                             

10:43 Order name: Flu                                                                         kb  

10/27                                                                                             

11:34 Order name: Mono Screen; Complete Time: 11:34                                           EDMS

                                                                                                  

Administered Medications:                                                                         

No medications were administered                                                                  

                                                                                                  

                                                                                                  

Disposition:                                                                                      

10/28                                                                                             

11:49 Co-signature as Attending Physician, Chicho Glasgow MD I agree with the assessment and  jenny 

      plan of care.                                                                               

                                                                                                  

Disposition:                                                                                      

10/27/20 11:56 Discharged to Home. Impression: Infectious mononucleosis.                          

- Condition is Stable.                                                                            

- Discharge Instructions: Infectious Mononucleosis, Easy-to-Read.                                 

                                                                                                  

- Work release form, Medication Reconciliation Form, Thank You Letter, Antibiotic                 

  Education, Prescription Opioid Use form.                                                        

- Follow up: Emergency Department; When: As needed; Reason: Worsening of condition.               

  Follow up: Private Physician; When: 2 - 3 days; Reason: Recheck today's complaints,             

  Continuance of care, Re-evaluation by your physician.                                           

                                                                                                  

                                                                                                  

                                                                                                  

Signatures:                                                                                       

Dispatcher [employer]                           EDMS                                                 

Mary Worley, FNP-C                 [facility]-Chicho Whitfield MD MD cha Williams, Irene, Fred Collier RN, RN                      RN   bp                                                   

                                                                                                  

Corrections: (The following items were deleted from the chart)                                    

10/27                                                                                             

12:11 11:56 10/27/2020 11:56 Discharged to Home. Impression: Infectious mononucleosis.        bp  

      Condition is Stable. Discharge Instructions: Infectious Mononucleosis, Easy-to-Read.        

      Forms are Medication Reconciliation Form, Thank You Letter, Antibiotic Education,           

      Prescription Opioid Use. Follow up: Emergency Department; When: As needed; Reason:          

      Worsening of condition. Follow up: Private Physician; When: 2 - 3 days; Reason: Recheck     

      today's complaints, Continuance of care, Re-evaluation by your physician. kb                

                                                                                                  

**************************************************************************************************

## 2020-10-27 NOTE — XMS REPORT
Summary of Care

                           Created on:2020



Patient:Fanta Davis

Sex:Female

:1965

External Reference #:[id number]





Demographics







                          Address                   [address] [place] [address]

 

                          Mobile Phone              [phone]

 

                          Home Phone                [phone]

 

                          Email Address             [email or web address]

 

                          Preferred Language        English

 

                          Marital Status            

 

                          Roman Catholic Affiliation     Unknown

 

                          Race                      White

 

                          Ethnic Group              Not  or 









Author







                          Organization              [facility]

 

                          Address                   [address] [place] [address]









Support







                Name            Relationship    Address         Phone

 

                Taz Davis    Unavailable     Unavailable     [phone]









Care Team Providers







                    Name                Role                Phone

 

                    GILBERTO Gimenez          Primary Care Provider [phone]









Reason for Referral

 (Routine)





           Status     Reason     Specialty  Diagnoses / Referred By Referred To



                                            Procedures Contact    Contact

 

                New Request                     Family Medicine Diagnoses



Encounter for well woman exam with routine gynecological exam



Elevated BP without diagnosis of hypertension Glory Blanco,           Bao

g,



                                                                



Procedures



CONSULT/REFERRAL FAMILY MEDICINE



CONSULT/REFERRAL FAMILY MEDICINE        MD Gil MD







                                                       [address] [place] [address]



                                                                 Dr. Dr Camarillo 



                                        [facility] 







                                                       [place] [id number]







                                                       Phone:     Phone:



                                                                 [phone]







                                                       Fax:       Fax:



                                                       [phone]





 (Routine)





           Status     Reason     Specialty  Diagnoses / Referred By Referred To



                                            Procedures Contact    Contact

 

                New Request                                     Diagnoses



Encounter for screening colonoscopy     Glory Blanco MD Humphrey, Laurel,



                                                                



Procedures



CONSULT/REFERRAL GENERAL SURGERY [address] [place] [address]           MD Vallejo



                                        Formerly [facility] 



                                        [facility] [address]







                                                                 [address]



                                        [place], [place]



                                                       Phone:     [phone]



                                        Phone:



                                                       Fax: [phone]







                                                                  Fax: [phone]





Radiology Services (Routine)





           Status     Reason     Specialty  Diagnoses / Referred By Referred To



                                            Procedures Contact    Contact

 

                New Request                     Diagnostic      Diagnoses



Encounter for screening mammogram for malignant neoplasm of breast



Encounter for well woman exam with routine gynecological exam Glory Blanco,   

        



                                                Radiology       



Procedures



BI SCREENING MAMMOGRAM BILATERAL        MD



                                        



                                                       [address] [place] [address] 



                                                                 Dr. Camarillo 



                                        



                                                       [place], [place] 



                                                                 [address]



                                        



                                                       Phone:     



                                                                 [phone]



                                        



                                                       Fax:       



                                                       [phone] 









Reason for Visit







                          Reason                    Comments

 

                          Well Woman Exam           







Encounter Details







             Date         Type         Department   Care Team    Description

 

                10/07/2020      Office Visit    [facility] Women's AdumGlory MD



                                        Encounter for well woman exam with damien gonzalez gynecological exam (Primary Dx);



                                                            [address]           Encounter for screening mamm

ogram for malignant neoplasm of 

breast;



                                                [address] [place] [address] DrLoree



                                        Encounter for screening colonoscopy;



                                                            Drive, [address]



                                        [address]



                                        Elevated BP without diagnosis of hyperte

nsion



                                       [place] 



                                                            [address]



                                        [id number] [phone] (Fax) 







Allergies

No Known Allergiesdocumented as of this encounter (statuses as of 10/08/2020)



Medications







          Medication Sig       Dispensed Refills   Start Date End Date  Status

 

          azelastine 137 mcg Use 1 Spray in each 30 mL     0         2020 

          Active



          (0.1 %) nasal nostril 2 (two)                                         



          sprayIndications: times daily. Use in                                 

        



          Sore throat, each nostril as                                         



          Post-nasal drip, directed                                          



          Cough                                                       



documented as of this encounter (statuses as of 10/08/2020)



Active Problems







                          Problem                   Noted Date

 

                          Sore throat               2020

 

                          Post-nasal drip           2020

 

                          Cough                     2020

 

                          Obesity (BMI 30-39.9)     2018

 

                          Family history of early CAD 2018



documented as of this encounter (statuses as of 10/08/2020)



Resolved Problems







                    Problem             Noted Date          Resolved Date

 

                    Chest pain          2018



documented as of this encounter (statuses as of 10/08/2020)



Social History







             Tobacco Use  Types        Packs/Day    Years Used   Date

 

             Never Smoker                                        









                Smokeless Tobacco: Never Used                                 









                Alcohol Use     Drinks/Week     oz/Week         Comments

 

                Yes                                             









                    Alcohol Habits      Answer              Date Recorded

 

                    How often do you have a drink containing alcohol? Monthly or

 less     10/07/2020

 

                    How many drinks containing alcohol do you have on a Not aske

d           10/07/2020



                    typical day when you are drinking?                     

 

                    How often do you have six or more drinks on one Not asked   

        10/07/2020



                    occasion?                               









                          Sex Assigned at Birth     Date Recorded

 

                          Not on file               









                    COVID-19 Exposure   Response            Date Recorded

 

                    In the last month, have you been in contact with No / Unsure

         10/7/2020  2:59 PM 

CDT



                    someone who was confirmed or suspected to have              

       



                    Coronavirus / COVID-19?                     



documented as of this encounter



Last Filed Vital Signs







                Vital Sign      Reading         Time Taken      Comments

 

                Blood Pressure  148/77          10/07/2020  3:35 PM CDT 

 

                Pulse           66              10/07/2020  3:35 PM CDT 

 

                Temperature     36.8 C (98.3 F) 10/07/2020  3:35 PM CDT 

 

                Respiratory Rate 18              10/07/2020  3:35 PM CDT 

 

                Oxygen Saturation -               -               

 

                Inhaled Oxygen Concentration -               -               

 

                Weight          82.8 kg (182 lb 9.6 oz) 10/07/2020  3:35 PM CDT 

 

                Height          157.5 cm (5' 2") 10/07/2020  3:35 PM CDT 

 

                Body Mass Index 33.4            10/07/2020  3:35 PM CDT 



documented in this encounter



Progress Notes

Glory Blanco MD - 10/07/2020  3:30 PM CDTFormatting of this note might be 
different from the original.

Chief complaint:

Chief Complaint

Patient presents with

 Well Woman Exam



Hope Olga Davis is a 54 year-old  who presents for WWE.

She has no concerns today but would like to make sure that the cervical polyp 
that was removed by Tanya in 2018 has not recurred. She reports regular 
cycles, denies intermenstrual or post coital bleeding

She is , denies domestic or immediate partner abuse. She declined STI 
screening. BC is a BTL

Last pap smear and mammogram were in 2018, she denies history of abnormal 
results. She has never hada colonoscopy.

Denies family history of breast, ovarian, endometrial or colon cancer



Histories

OB History

 Para Term  AB Living

8 7     1 7

SAB TAB Ectopic Multiple Live Births

        7



# Outcome Date GA Lbr David/2nd Weight Sex Delivery Anes PTL Lv

8 AB

7 Para

6 Para

5 Para

4 Para

3 Para

2 Para

1 Para



Obstetric Comments

4 vaginal deliveries

3 C-Sections

1st C -Section was due to Breech

One Miscarriage



Past Medical History:

Diagnosis Date

 Abnormal uterine bleeding

 Anemia

 Breast disorder 2018

 Left breast Usg / Negative

 Cervical polyp

 Heart murmur

 Dr Medrano noted

 Hypertension

 Urinary incontinence



Family History

Problem Relation Age of Onset

 Coronary Heart Disease Mother

 Stroke Mother

 Coronary Heart Disease Brother

 Aneurysm Father

 Arthritis NoFHx

 Asthma NoFHx

 Birth defects NoFHx

 Breast Cancer NoFHx

 Colon Cancer NoFHx

 Ovarian Cancer NoFHx

 Uterine Cancer NoFHx

 Cancer NoFHx

 Depression NoFHx

 Diabetes NoFHx

 Genetic NoFHx

 Heart NoFHx

 High cholesterol NoFHx

 Hypertension NoFHx

 Mental retardation NoFHx

 Neurological NoFHx

 Osteoporosis NoFHx

 Psychiatry NoFHx

 Other - see comments NoFHx



Family Status

Relation Name Status

 Mo  

 Bro  (Not Specified)

 Fa  

 NoFHx  (Not Specified)



Past Surgical History:

Procedure Laterality Date

  SECTION

 x'3

 TUBAL LIGATION

 x's 2



Social History



Socioeconomic History

 Marital status: 

  Spouse name: Not on file

 Number of children: Not on file

 Years of education: Not on file

 Highest education level: Not on file

Occupational History

 Not on file

Social Needs

 Financial resource strain: Not on file

 Food insecurity

  Worry: Not on file

  Inability: Not on file

 Transportation needs

  Medical: Not on file

  Non-medical: Not on file

Tobacco Use

 Smoking status: Never Smoker

 Smokeless tobacco: Never Used

Substance and Sexual Activity

 Alcohol use: Yes

  Frequency: Monthly or less

 Drug use: No

 Sexual activity: Yes

  Partners: Male

  Birth control/protection: Surgical

  Comment: BTL

Lifestyle

 Physical activity

  Days per week: Not on file

  Minutes per session: Not on file

 Stress: Not on file

Relationships

 Social connections

  Talks on phone: Not on file

  Gets together: Not on file

  Attends Caodaism service: Not on file

  Active member of club or organization: Not on file

  Attends meetings of clubs or organizations: Not on file

  Relationship status: Not on file

 Intimate partner violence

  Fear of current or ex partner: Not on file

  Emotionally abused: Not on file

  Physically abused: Not on file

  Forced sexual activity: Not on file

Other Topics Concern

 Not on file

Social History Narrative

 Lives at home with 

 Denied domestic or physical violence w/i the home

 Roman Catholic Preference: Worship



Social History



Substance and Sexual Activity

Sexual Activity Yes

 Partners: Male

 Birth control/protection: Surgical

 Comment: BTL





Labs

No new labs



Radiology

No new radiology.



Allergies

Hope has No Known Allergies.



Medications

Hope has a current medication list which includes the following prescription(s):
azelastine.



Review of Systems

Constitutional: Negative.

HENT: Negative.

Eyes: Negative.

Respiratory: Negative.

Breasts: Negative.

Cardiovascular: Negative.

Gastrointestinal: Negative.

Genitourinary: Negative.

Musculoskeletal: Negative.

Skin: Negative.

Neurological: Negative.

Psychiatric/Behavioral: Negative.

Endocrine: Endocrine negative



BP (!) 148/77 (BP Location: Left arm, Patient Position: Sitting, BP CUFF SIZE: 
Adult Medium)  | Pulse 66  | Temp 36.8 C (98.3 F) (Oral)  | Resp 18  | Ht 5'
2" (1.575 m)  | Wt 182 lb 9.6 oz (82.8 kg)  | LMP 09/10/2020 (Approximate)  | 
BMI 33.40 kg/m

Pregravid BMI: Could not be calculated



Physical Exam

Vitals reviewed.

Constitutional: She is oriented to person, place, and time. She appears well-
developed and well-groomed.

Neck: No tenderness and no mass. No thyroid nodules palpated. No neck 
adenopathy.

Cardiovascular: Regular rate and rhythm. No murmur auscultated. No peripheral 
edema present.

Pulmonary/Chest: Breath sounds clear to auscultation. Normal inspiratory effort.

Abdominal: Abdomen is soft. No mass palpated. No tenderness present. There is no
rigidity and no guarding.

Neuro/Psychiatric: She has a normal mood and affect. She is oriented to person, 
place, and time.

Skin: Skin normal.

Lymphadenopathy: No neck adenopathy present. No axillary adenopathy present. No 
inguinal adenopathy present.



Breast: Right breast exhibits no mass, no nipple discharge and no tenderness. 
Left breast exhibits no mass, no nipple discharge and no tenderness.

External genitalia: Normal external genitalia appropriate for age.

Vagina:No lesion inspected. Normal support. No abnormal vaginal discharge found.
   No lesions in the vagina.



Cervix: No lesion. No tenderness and no discharge present.

Uterus: Uterus is normal size, normal contour, normal position and non-tender.

Adnexa: Right adnexa without tenderness, ovary enlargement or mass. Left adnexa 
without tenderness, ovary enlargement or mass.







Assessment/Plan

Encounter for well woman exam with routine gynecological exam  (primary 
encounter diagnosis)

Comment: Reviewed and encouraged good nutrition, regular exercise, use of 
sunscreen, awareness of her breasts, routine annuals and mammograms. Also age 
appropriate vaccinations and screening labs were discussed. Colonoscopy after 
age of 51 and DEXA scan 66 years of age. Bone health-adequate Vit D and calcium 
with weight bearing exercise. Expectation and changes during perimenopausal 
stage were discussed and she was encouraged to call with any concerns.

Plan: BI SCREENING MAMMOGRAM BILATERAL, PAP Smear-Liquid Based, HIGH RISK HPV-
THIN PREP, PAP Smear-Liquid Based, HIGH RISK HPV-THIN PREP

Encounter for screening mammogram for malignant neoplasm of breast

Plan: BI SCREENING MAMMOGRAM BILATERAL



Encounter for screening colonoscopy

Comment:Reviewed the options for colon cancer screening. sigmoidoscopy every 5 
years, colonoscopy (gold standard) every 10 years, FOBT yearly, stool DNA. 
Patient accepted colonoscopy and referral generated

Plan: CONSULT/REFERRAL GENERAL SURGERY



Elevated BP without diagnosis of hypertension

Patient reports that she is aware of her elevated BP- it is intermittent and she
keeps an eye on it but hasn't been formally diagnosed with hypertension. She 
hasn't seen her PCP in years and would to establish care with one here. Her 
 see Dr Mooney. Referral generated.

Stressed and encouraged lifestyle modification, - change in diet, with regular 
exercise



This visit did not involve counseling and coordination that comprised more than 
50% of the visit time.

Glory Blanco MD



Electronically signed by Glory Blanco MD at 10/08/2020 10:31 AM CDTdocumented
in this encounter



Plan of Treatment







             Date         Type         Specialty    Care Team    Description

 

                2020      Office Visit    Family Medicine Gil Mooney MD



                                        



                                                                [address] [place] [address] 

Dr Camarillo [address] [place] [address] 775

15



                                        



                                                                [phone] (Fax) 

 

                10/13/2021      Office Visit    Obstetrics & Gynecology Mariola Blanco MD



                                        



                                                                [address] [place] [address] 

Dr. Camarillo [address] [place] [address] 775

 [phone] (Fax) 









             Name         Type         Priority     Associated Diagnoses Date/Ti

me

 

             HIGH RISK HPV-THIN LAB          Routine      Encounter for well wom

an 10/07/2020  4:49 PM CDT



             PREP                                   exam with routine 



                                                    gynecological exam 

 

             LAB ONLY PAP LAB          Routine      Encounter for well woman 10/

2020  4:49 PM CDT



             SMEAR-LIQUID BASED                           exam with routine 



                                                    gynecological exam 









             Name         Type         Priority     Associated Diagnoses Order S

chedule

 

             BI SCREENING MAMMOGRAM IMAGING      Routine      Encounter for scre

ening Expected:



             BILATERAL                              mammogram for malignant 10/0

2020, Expires:



                                                                neoplasm of flory

st



                                        2021



                                                    Encounter for well woman 



                                                    exam with routine 



                                                    gynecological exam 

 

             HIGH RISK HPV-THIN LAB          Routine      Encounter for well wom

an Expected:



             PREP                                   exam with routine 10/07/2020

, Expires:



                                                    gynecological exam 10/07/202

1









                Health Maintenance Due Date        Last Done       Comments

 

                Depression Screening 1978                      

 

                DTaP,Tdap,and Td Vaccines (1 - 1985                      



                Tdap)                                           

 

                PAP SMEAR       1987                      

 

                Breast Cancer Screening (MAMMOGRAM) 2006                  

    

 

                COLON CANCER SCREENING ANNUAL 2016                      



                FIT/FOBT                                        

 

                COLON CANCER SCREENING FIT DNA 2016                      



                EVERY 3 YEARS                                   

 

                COLON CANCER SCREENING 2016                      



                SIGMOIDOSCOPY EVERY 5 YEARS                                 

 

                COLONOSCOPY     2016                      

 

                Colorectal Cancer Screening 2016                      

 

                Zoster Recombinant Vaccine 2016                      



                (SHINGRIX) (1 of 2)                                 

 

                INFLUENZA VACCINE (#1) 2020                      

 

                PNEUMOCOCCAL 0-64 YEARS COMBINED Aged Out                       

 No longer eligible based on



                SERIES                                          patient's age to

 complete



                                                                this topic



documented as of this encounter



Procedures







             Procedure Name Priority     Date/Time    Associated Diagnosis Comme

nts

 

             PAP SMEAR-LIQUID Routine      10/07/2020  4:49 PM Encounter for wel

l woman 



             BASED-CP                  CDT          exam with routine 



                                                    gynecological exam 



documented in this encounter



Results

PAP Smear-Liquid Based (10/07/2020  4:49 PM CDT)





                                        Specimen

 

                                        Swab - CERVIX









                Performing Organization Address         City/State/Zipcode Phone

 Number

 

                          [facility]  CLIA:  [id number]



                          [place] [address] [phone]



                                [address] [place] [address]                 



documented in this encounter



Visit Diagnoses







                                        Diagnosis

 

                                        Encounter for well woman exam with damien gonzalez gynecological exam - Primary

 

                                        Encounter for screening mammogram for ma

lignant neoplasm of breast







                                        Other screening mammogram

 

                                        Encounter for screening colonoscopy







                                        Special screening for malignant neoplasm

s, colon

 

                                        Elevated BP without diagnosis of hyperte

nsion



documented in this encounter



Insurance







          Payer     Benefit Plan / Subscriber ID Effective Dates Phone     Addre

ss   Type



                    Group                                             

 

          COMMERCIAL COMMERCIAL [id number] 2020-Presen                     

O/PPO/PO



          NON-CONTRACT NON-CONTRACT           t                             S



          GENERIC   GENERIC                                           









           Guarantor Name Account Type Relation to Date of    Phone      Billing



                                 Patient    Birth                 Address

 

           Fanta Davis Personal/Family Self       1965 [phone] [address] [facility]                                      (Home)     [place], [place]



                                                                  [address]



documented as of this encounter

## 2020-10-27 NOTE — XMS REPORT
Summary of Care

                           Created on:2020



Patient:Fanta Davis

Sex:Female

:1965

External Reference #:[id number]





Demographics







                          Address                   [address] [place] [address]

 

                          Home Phone                [phone]

 

                          Mobile Phone              [phone]

 

                          Email Address             [email or web address]

 

                          Preferred Language        English

 

                          Marital Status            

 

                          Pentecostalism Affiliation     Unknown

 

                          Race                      White

 

                          Ethnic Group              Not  or 









Author







                          Organization              [facility]

 

                          Address                   [address] [place] [id number]









Support







                Name            Relationship    Address         Phone

 

                Steve Cuevas   Unavailable     Unavailable     [phone]

 

                Small Eufemia    Unavailable     Unavailable     Unavailable









Care Team Providers







                    Name                Role                Phone

 

                    Pcp,  Does Not Have A Primary Care Provider [phone]









Reason for Visit







                          Reason                    Comments

 

                          Refill Request            







Encounter Details







             Date         Type         Department   Care Team    Description

 

                2020      Refill          [facility] Jim puckett, JONNA Lennon



                                        Refill Request



                                                            - [address] 



                                        



                                                            [address]



                                        [place] [address]



                                        



                                                            [place] [address]

[address]



                                        [phone] (Fax) 







Allergies

No Known Allergiesdocumented as of this encounter (statuses as of 2020)



Medications







          Medication Sig       Dispensed Refills   Start Date End Date  Status

 

          azelastine 137 mcg Use 1 Spray in each 30 mL     0         2020 

          Active



          (0.1 %) nasal nostril 2 (two)                                         



          sprayIndications: times daily. Use in                                 

        



          Sore throat, each nostril as                                         



          Post-nasal drip, directed                                          



          Cough                                                       



documented as of this encounter (statuses as of 2020)



Active Problems







                          Problem                   Noted Date

 

                          Sore throat               2020

 

                          Post-nasal drip           2020

 

                          Cough                     2020

 

                          Obesity (BMI 30-39.9)     2018

 

                          Family history of early CAD 2018



documented as of this encounter (statuses as of 2020)



Resolved Problems







                    Problem             Noted Date          Resolved Date

 

                    Chest pain          2018



documented as of this encounter (statuses as of 2020)



Social History







             Tobacco Use  Types        Packs/Day    Years Used   Date

 

             Never Smoker                                        









                Smokeless Tobacco: Never Used                                 









                Alcohol Use     Drinks/Week     oz/Week         Comments

 

                No                                              









                          Sex Assigned at Birth     Date Recorded

 

                          Not on file               









                    COVID-19 Exposure   Response            Date Recorded

 

                    In the last month, have you been in contact with No / Unsure

         2020  3:05 PM 

CDT



                    someone who was confirmed or suspected to have              

       



                    Coronavirus / COVID-19?                     



documented as of this encounter



Last Filed Vital Signs

Not on filedocumented in this encounter



Plan of Treatment







                Health Maintenance Due Date        Last Done       Comments

 

                Depression Screening 1978                      

 

                DTaP,Tdap,and Td Vaccines (1 - 1985                      



                Tdap)                                           

 

                PAP SMEAR       1987                      

 

                Breast Cancer Screening (MAMMOGRAM) 2006                  

    

 

                COLON CANCER SCREENING ANNUAL 2016                      



                FIT/FOBT                                        

 

                COLON CANCER SCREENING FIT DNA 2016                      



                EVERY 3 YEARS                                   

 

                COLON CANCER SCREENING 2016                      



                SIGMOIDOSCOPY EVERY 5 YEARS                                 

 

                COLONOSCOPY     2016                      

 

                Colorectal Cancer Screening 2016                      

 

                Zoster Recombinant Vaccine 2016                      



                (SHINGRIX) (1 of 2)                                 

 

                INFLUENZA VACCINE (#1) 2020                      

 

                PNEUMOCOCCAL 0-64 YEARS COMBINED Aged Out                       

 No longer eligible based on



                SERIES                                          patient's age to

 complete



                                                                this topic



documented as of this encounter



Results

Not on filedocumented in this encounter



Visit Diagnoses







                                        Diagnosis

 

                                        Sore throat







                                        Acute pharyngitis

 

                                        Post-nasal drip







                                        Postnasal drip

 

                                        Cough



documented in this encounter



Insurance







          Payer     Benefit Plan / Subscriber ID Effective Dates Phone     Addre

ss   Type



                    Group                                             

 

          COMMERCIAL COMMERCIAL [id number] 2020-Pilo                     HM

O/PPO/PO



          NON-CONTRACT NON-CONTRACT           t                             S



          GENERIC   GENERIC                                           



documented as of this encounter

## 2023-07-18 ENCOUNTER — HOSPITAL ENCOUNTER (EMERGENCY)
Dept: HOSPITAL 97 - ER | Age: 58
Discharge: HOME | End: 2023-07-18
Payer: COMMERCIAL

## 2023-07-18 VITALS — OXYGEN SATURATION: 100 % | TEMPERATURE: 97.9 F

## 2023-07-18 VITALS — SYSTOLIC BLOOD PRESSURE: 126 MMHG | DIASTOLIC BLOOD PRESSURE: 67 MMHG

## 2023-07-18 DIAGNOSIS — R91.1: ICD-10-CM

## 2023-07-18 DIAGNOSIS — N39.0: Primary | ICD-10-CM

## 2023-07-18 DIAGNOSIS — N20.9: ICD-10-CM

## 2023-07-18 LAB
ALBUMIN SERPL BCP-MCNC: 3.9 G/DL (ref 3.4–5)
ALP SERPL-CCNC: 79 U/L (ref 45–117)
ALT SERPL W P-5'-P-CCNC: 43 U/L (ref 13–56)
AST SERPL W P-5'-P-CCNC: 20 U/L (ref 15–37)
BUN BLD-MCNC: 8 MG/DL (ref 7–18)
GLUCOSE SERPLBLD-MCNC: 98 MG/DL (ref 74–106)
HCT VFR BLD CALC: 40.2 % (ref 36–45)
LIPASE SERPL-CCNC: 28 U/L (ref 13–75)
LYMPHOCYTES # SPEC AUTO: 2.3 K/UL (ref 0.7–4.9)
MCV RBC: 96.8 FL (ref 80–100)
PMV BLD: 10.4 FL (ref 7.6–11.3)
POTASSIUM SERPL-SCNC: 3.8 MEQ/L (ref 3.5–5.1)
RBC # BLD: 4.15 M/UL (ref 3.86–4.86)

## 2023-07-18 PROCEDURE — 81001 URINALYSIS AUTO W/SCOPE: CPT

## 2023-07-18 PROCEDURE — 85025 COMPLETE CBC W/AUTO DIFF WBC: CPT

## 2023-07-18 PROCEDURE — 74176 CT ABD & PELVIS W/O CONTRAST: CPT

## 2023-07-18 PROCEDURE — 87088 URINE BACTERIA CULTURE: CPT

## 2023-07-18 PROCEDURE — 83690 ASSAY OF LIPASE: CPT

## 2023-07-18 PROCEDURE — 36415 COLL VENOUS BLD VENIPUNCTURE: CPT

## 2023-07-18 PROCEDURE — 87086 URINE CULTURE/COLONY COUNT: CPT

## 2023-07-18 PROCEDURE — 80053 COMPREHEN METABOLIC PANEL: CPT

## 2023-07-18 NOTE — EDPHYS
Physician Documentation                                                                           

 [facility]                                                                 

Name: Fanta Davis                                                                                

Age: 57 yrs                                                                                       

Sex: Female                                                                                       

: 1965                                                                                   

MRN: [id number]                                                                                   

Arrival Date: 2023                                                                          

Time: 04:18                                                                                       

Account#: [id number]                                                                            

Bed 5                                                                                             

Private MD:                                                                                       

ED Physician Tara Garcia                                                                         

HPI:                                                                                              

                                                                                             

04:48 This 57 yrs old Female presents to ER via Ambulatory with complaints of Low Back Pain.  sp3 

04:48 57-year-old female with no past medical history presents with right sided flank pain    sp3 

      and low back pain for 1 day. Patient's pain started yesterday insidiously without any       

      provocation, trauma or event. Pain is described as deep and cramping in nature and is       

      waxing and waning. She denies any fever, headache, URI symptoms, neck pain, chest pain,     

      upper back pain, shortness of breath, anterior abdominal pain, nausea, vomiting,            

      diarrhea, syncope, focal neurodeficit, or any other signs or symptoms on ROS at this        

      time..                                                                                      

                                                                                                  

Historical:                                                                                       

- Allergies:                                                                                      

04:28 No Known Allergies;                                                                     as6 

- PMHx:                                                                                           

04:28 None;                                                                                   as6 

- PSHx:                                                                                           

04:28  section; Cholecystectomy;                                                      as6 

                                                                                                  

- Immunization history:: Client reports having NOT received the Covid vaccine.                    

- Social history:: Smoking status: Patient denies any tobacco usage or history of.                

                                                                                                  

                                                                                                  

ROS:                                                                                              

04:49 Constitutional: Negative for fever, chills, and weight loss, Eyes: Negative for injury, sp3 

      pain, redness, and discharge, ENT: Negative for injury, pain, and discharge, Neck:          

      Negative for injury, pain, and swelling, Cardiovascular: Negative for chest pain,           

      palpitations, and edema, Respiratory: Negative for shortness of breath, cough,              

      wheezing, and pleuritic chest pain, MS/Extremity: Negative for injury and deformity,        

      Skin: Negative for injury, rash, and discoloration, Neuro: Negative for headache,           

      weakness, numbness, tingling, and seizure, Psych: Negative for depression, anxiety,         

      suicide ideation, homicidal ideation, and hallucinations, Allergy/Immunology: Negative      

      for hives, rash, and allergies, Endocrine: Negative for neck swelling, polydipsia,          

      polyuria, polyphagia, and marked weight changes.                                            

04:49 All other systems are negative.                                                             

                                                                                                  

Exam:                                                                                             

04:49 Constitutional:  This is a well developed, well nourished patient who is awake, alert,  sp3 

      and in no acute distress. Head/Face:  Normocephalic, atraumatic. Eyes:  Pupils equal        

      round and reactive to light, extra-ocular motions intact.  Lids and lashes normal.          

      Conjunctiva and sclera are non-icteric and not injected.  Cornea within normal limits.      

      Periorbital areas with no swelling, redness, or edema. ENT:  Nares patent. No nasal         

      discharge, no septal abnormalities noted.  External auditory canals are clear.              

      Oropharynx with no redness, swelling, or masses, exudates, or evidence of obstruction,      

      uvula midline.  Mucous membranes moist. Neck:  Trachea midline, no thyromegaly or           

      masses palpated, and no cervical lymphadenopathy.  Supple, full range of motion without     

      nuchal rigidity, or vertebral point tenderness.  No Meningismus. Chest/axilla:  Normal      

      chest wall appearance and motion.  Nontender with no deformity.  No lesions are             

      appreciated. Cardiovascular:  Regular rate and rhythm with a normal S1 and S2.  No          

      gallops, murmurs, or rubs.  Normal PMI, no JVD.  No pulse deficits. Respiratory:  Lungs     

      have equal breath sounds bilaterally, clear to auscultation and percussion.  No rales,      

      rhonchi or wheezes noted.  No increased work of breathing, no retractions or nasal          

      flaring. Back:  No spinal tenderness.  No costovertebral tenderness.  Full range of         

      motion. Skin:  Warm, dry with normal turgor.  Normal color with no rashes, no lesions,      

      and no evidence of cellulitis. MS/ Extremity:  Pulses equal, no cyanosis.                   

      Neurovascular intact.  Full, normal range of motion. Neuro:  Awake and alert, GCS 15,       

      oriented to person, place, time, and situation.  Cranial nerves II-XII grossly intact.      

      Motor strength 5/5 in all extremities.  Sensory grossly intact.  Cerebellar exam            

      normal.  Normal gait. Psych:  Awake, alert, with orientation to person, place and time.     

       Behavior, mood, and affect are within normal limits.                                       

04:49 Abdomen/GI: No anterior abdominal pain.  Right CVA tenderness is noted on percussion..      

                                                                                                  

Vital Signs:                                                                                      

04:26  / 71; Pulse 67; Resp 18 S; Temp 97.9(O); Pulse Ox 100% on R/A; Weight 77.56 kg   as6 

      (R); Height 5 ft. 2 in. (R); Pain 7/10;                                                     

07:10  / 67; Pulse 66; Resp 17 S; Pulse Ox 100% on R/A;                                 kc6 

04:26 Body Mass Index 31.28 (77.56 kg, 157.48 cm)                                             as6 

04:26 Pain Scale: Adult                                                                       as6 

                                                                                                  

MDM:                                                                                              

04:48 Patient medically screened.                                                             sp3 

04:50 Data reviewed: vital signs, nurses notes, lab test result(s), radiologic studies. ED    sp3 

      course: 57-year-old female with right flank pain and low back pain. Differential            

      diagnosis includes kidney stone, UTI, pyelonephritis, adhesions, functional abdominal       

      pain, MSK pain, among others. I am not highly suspicious for acute coronary syndrome,       

      AAA, PE, abdominal ischemia, bowel obstruction, GYN pathology, or any other critical        

      pathology including sepsis at this time. Work-up will include CT scan of the abdomen        

      pelvis kidney stone protocol, laboratory values, urine analysis and pain medication as      

      needed..                                                                                    

06:06 ED course: Discussed with patient her findings. Patient has a urinary tract infection   sp3 

      and her urine also had calcium oxalate crystals in it which could be indicative of a        

      recently passed stone. CT scan demonstrates no ureteral calculi or any other                

      abnormality in the genitourinary system. There is a 5 mm incidental finding in the left     

      lower lobe of the lung in the form of a nodule that will need follow-up in 6 to 12          

      months. I have discussed all of these findings extensively with patient including need      

      for follow-up. She is also getting a copy of her scan report as well as images prior to     

      discharge. We will administer Levaquin 500 mg IV and then discharge patient home on         

      p.o. Levaquin with follow-up to PCP..                                                       

                                                                                                  

                                                                                             

04:33 Order name: CBC with Diff; Complete Time: 05:44                                         sp3 

                                                                                             

04:33 Order name: CMP; Complete Time: 05:44                                                   sp3 

                                                                                             

04:33 Order name: Lipase; Complete Time: 05:44                                                sp3 

                                                                                             

04:33 Order name: Urinalysis w/ reflexes; Complete Time: 05:44                                sp3 

                                                                                             

05:45 Order name: Urine Culture                                                               EDMS

                                                                                             

04:33 Order name: CT Abd/Pelvis - Without Contrast                                            sp3 

                                                                                             

04:33 Order name: IV Saline Lock; Complete Time: 05:13                                        sp3 

                                                                                             

04:33 Order name: Labs collected and sent; Complete Time: 05:13                               sp3 

                                                                                                  

Administered Medications:                                                                         

05:16 Drug: TORadol - Ketorolac IVP 15 mg Route: IVP; Site: right antecubital;                jb4 

07:11 Follow up: Response: No adverse reaction                                                kc6 

06:19 Drug: levofloxacin IVPB 500 mg Volume: 100 ml; Route: IVPB; Infused Over: 60 mins;      jb4 

      Site: right antecubital;                                                                    

07:19 Follow up: Response: No adverse reaction; IV Status: Completed infusion                 kc6 

                                                                                                  

                                                                                                  

Disposition Summary:                                                                              

23 06:08                                                                                    

Discharge Ordered                                                                                 

      Location: Home                                                                          sp3 

      Condition: Stable                                                                       sp3 

      Diagnosis                                                                                   

        - Urinary tract infection, calcium oxalate crystals in urine, incidental pulmonary    sp3 

      nodule                                                                                      

      Followup:                                                                               sp3 

        - With: Private Physician                                                                  

        - When: Upon discharge from the Emergency Department                                       

        - Reason: Continuance of care                                                              

      Discharge Instructions:                                                                     

        - Discharge Summary Sheet                                                             sp3 

        - Urinary Tract Infection, Adult                                                      sp3 

      Forms:                                                                                      

        - Medication Reconciliation Form                                                      sp3 

        - Thank You Letter                                                                    sp3 

        - Antibiotic Education                                                                sp3 

        - Prescription Opioid Use                                                             sp3 

        - Patient Portal Instructions                                                         sp3 

      Prescriptions:                                                                              

        - Diclofenac Sodium 75 mg Oral Tablet Sustained Release                                    

            - take 1 tablet by ORAL route 2 times per day; 30 tablet; Refills: 0, Product     sp3 

      Selection Permitted                                                                         

        - levofloxacin 500 mg Oral Tablet                                                          

            - take 1 tablet by ORAL route once daily for 7 days; 7 tablet; Refills: 0,        sp3 

      Product Selection Permitted                                                                 

Signatures:                                                                                       

Dispatcher [employer]                           Alexander Burgos RN                       RN   jb4                                                  

Tara Garcia MD MD   sp3                                                  

Abdirahman Park RN RN   as6                                                  

Ester Vieira RN   kc6                                                  

                                                                                                  

Corrections: (The following items were deleted from the chart)                                    

05:18 04:34 Pregnancy Test, Urine+[id number].BRZ ordered. EDMS                                    EDMS

                                                                                                  

**************************************************************************************************

## 2023-07-18 NOTE — ER
Nurse's Notes                                                                                     

 [facility]                                                                 

Name: Fanta Davis                                                                                

Age: 57 yrs                                                                                       

Sex: Female                                                                                       

: 1965                                                                                   

MRN: [id number]                                                                                   

Arrival Date: 2023                                                                          

Time: 04:18                                                                                       

Account#: [id number]                                                                            

Bed 5                                                                                             

Private MD:                                                                                       

Diagnosis: Urinary tract infection, calcium oxalate crystals in urine, incidental pulmonary nodule

                                                                                                  

Presentation:                                                                                     

                                                                                             

04:26 Chief complaint: Patient states: "I think I'm getting a kidney infection" pt c/o right  as6 

      flank pain. Coronavirus screen: At this time, the client does not indicate any symptoms     

      associated with coronavirus-19. Ebola Screen: No symptoms or risks identified at this       

      time. Initial Sepsis Screen: Does the patient meet any 2 criteria? No. Patient's            

      initial sepsis screen is negative. Does the patient have a suspected source of              

      infection? No. Patient's initial sepsis screen is negative. Risk Assessment: Do you         

      want to hurt yourself or someone else? Patient reports no desire to harm self or            

      others. Onset of symptoms was 2023.                                                

04:26 Method Of Arrival: Ambulatory                                                           as6 

04:26 Acuity: SIMRAN 3                                                                           as6 

                                                                                                  

Triage Assessment:                                                                                

07:19 General: Appears.                                                                       kc6 

                                                                                                  

Historical:                                                                                       

- Allergies:                                                                                      

04:28 No Known Allergies;                                                                     as6 

- PMHx:                                                                                           

04:28 None;                                                                                   as6 

- PSHx:                                                                                           

04:28  section; Cholecystectomy;                                                      as6 

                                                                                                  

- Immunization history:: Client reports having NOT received the Covid vaccine.                    

- Social history:: Smoking status: Patient denies any tobacco usage or history of.                

                                                                                                  

                                                                                                  

Screenin:00 [facility] ED Fall Risk Assessment (Adult) History of falling in the last 3 months,       kc6 

      including since admission No falls in past 3 months (0 pts) Confusion or Disorientation     

      No (0 pts) Intoxicated or Sedated No (0 pts) Impaired Gait No (0 pts) Mobility Assist       

      Device Used No (0 pt) Altered Elimination No (0 pt) Score/Fall Risk Level 0 - 2 = Low       

      Risk Oriented to surroundings, Maintained a safe environment, Educated pt \T\ family on     

      fall prevention, incl call for assistance when getting out of bed, Assessed \T\             

      reinforced patient's understanding of fall precautions, Hourly rounding (assess needs \T\   

      fall precautionary measures) done. Abuse screen: Denies threats or abuse. Denies            

      injuries from another. Nutritional screening: No deficits noted. Tuberculosis               

      screening: No symptoms or risk factors identified.                                          

                                                                                                  

Assessment:                                                                                       

07:00 Reassessment: Patient appears in no apparent distress at this time. Patient and/or      kc6 

      family updated on plan of care and expected duration. Pain level reassessed. Patient is     

      alert, oriented x 3, equal unlabored respirations, skin warm/dry/pink. per GABBY Dsouza,       

      discharge pending levofloxacin completion.                                                  

                                                                                                  

Vital Signs:                                                                                      

04:26  / 71; Pulse 67; Resp 18 S; Temp 97.9(O); Pulse Ox 100% on R/A; Weight 77.56 kg   as6 

      (R); Height 5 ft. 2 in. (R); Pain 7/10;                                                     

07:10  / 67; Pulse 66; Resp 17 S; Pulse Ox 100% on R/A;                                 kc6 

04:26 Body Mass Index 31.28 (77.56 kg, 157.48 cm)                                             as6 

04:26 Pain Scale: Adult                                                                       as6 

                                                                                                  

ED Course:                                                                                        

04:20 Patient arrived in ED.                                                                  ag3 

04:28 Triage completed.                                                                       as6 

04:29 Arm band placed on.                                                                     as6 

04:32 Tara Garcia MD is Attending Physician.                                                sp3 

05:00 Alexander Palmer, RN is Primary Nurse.                                                     jb4 

05:13 CT Abd/Pelvis - Without Contrast In Process Unspecified.                                EDMS

07:00 Patient has correct armband on for positive identification. Placed in gown. Bed in low  kc6 

      position. Call light in reach. Side rails up X 1. Report received from GABBY Dsouza.           

07:19 No provider procedures requiring assistance completed. IV discontinued, intact,         kc6 

      bleeding controlled, No redness/swelling at site. Pressure dressing applied.                

                                                                                                  

Administered Medications:                                                                         

05:16 Drug: TORadol - Ketorolac IVP 15 mg Route: IVP; Site: right antecubital;                jb4 

07:11 Follow up: Response: No adverse reaction                                                kc6 

06:19 Drug: levofloxacin IVPB 500 mg Volume: 100 ml; Route: IVPB; Infused Over: 60 mins;      jb4 

      Site: right antecubital;                                                                    

07:19 Follow up: Response: No adverse reaction; IV Status: Completed infusion                 kc6 

                                                                                                  

                                                                                                  

Medication:                                                                                       

07:20 VIS not applicable for this client.                                                     kc6 

                                                                                                  

Outcome:                                                                                          

06:08 Discharge ordered by MD.                                                                sp3 

07:20 Discharged to home ambulatory.                                                          kc6 

07:20 Condition: improved                                                                         

07:20 Discharge instructions given to patient, Instructed on discharge instructions, follow       

      up and referral plans. medication usage, Demonstrated understanding of instructions,        

      follow-up care, medications, Prescriptions given X 2.                                       

07:20 Patient left the ED.                                                                    kc6 

                                                                                                  

Signatures:                                                                                       

Dispatcher [employer]                           EDMS                                                 

Alexander Palmer, RN                       RN   jb4                                                  

Ngoc Valladares3                                                  

Tara Garcia MD MD   sp3                                                  

Abdirahman Park RN                      RN   as6                                                  

Ester Vieira RN                   RN   kc6                                                  

                                                                                                  

**************************************************************************************************

## 2023-07-18 NOTE — RAD REPORT
EXAM DESCRIPTION:  CT - Abdomen   Pelvis Wo Contrast - 2023 6:41 am

 

 

 

CLINICAL HISTORY:  57 years   Female   RIGHT FLANK PAIN

 

COMPARISON:  None

 

TECHNIQUE:  CT of the abdomen and pelvis without contrast.

All CT scans at this facility use dose modulation, iterative reconstruction, and/or weight based dosi
ng when appropriate to reduce radiation dose to as low as reasonably achievable.

 

FINDINGS:  Lower thorax:

5 mm left lower lobe pulmonary nodule (axial image 11)

Minimal left basilar scarring.

Abdomen:

Stomach: Within normal limits

Liver: No focal lesions. No intrahepatic ductal distention.

Gallbladder: Surgically absent.

Pancreas: Within normal limits

Spleen: Within normal limits

Right kidney: No hydronephrosis. No renal or ureteral calculi.

Left kidney: No hydronephrosis. No renal or ureteral calculi.

Adrenal glands: Within normal limits

Vascular structures: Mild atherosclerosis of the abdominal aorta.

Lymph nodes: No lymphadenopathy by size criteria

Pelvis:

Small bowel: No significant distention.

Appendix: Within normal limits

Colon: No distention or acute pericolonic edema.

Peritoneum: No free intraperitoneal fluid or air.

Bones: No acute bone findings.

Bladder: Under distended, limiting evaluation.

Reproductive organs: Small volume of fluid and gas in the endometrial canal. Defect along the anterio
r uterus, likely secondary to  section.

Note that evaluation of the bowel and solid organs is somewhat limited due to lack of intravenous and
 oral contrast.

 

IMPRESSION:  1.   No urinary tract calculi. No hydronephrosis.

2.   Small volume of fluid and gas in the endometrial canal, can be seen in setting of recent gynecol
ogic intervention. Defect along the anterior uterus, likely secondary to  section. Correlate 
with surgical history.

3.   Left lower lobe pulmonary nodule measuring 5 mm.   If patient is low risk for malignancy, no rou
christine follow-up imaging is recommended; if patient is high risk for malignancy, a non-contrast Chest C
T at 12 months is optional. If performed and the nodule is stable at 12 months, no further follow-up 
is recommended.

 

Electronically signed by:   Sterling Lozano MD   2023 5:57 AM CDT Workstation: [id number]

 

 

Due to temporary technical issues with the PACS/Fluency reporting system, reports are being signed by
 the in house radiologists without review as a courtesy to insure prompt reporting. The interpreting 
radiologist is fully responsible for the content of the report.

## 2023-07-18 NOTE — XMS REPORT
Continuity of Care Document

                            Created on:2023



Patient:OLIVIA DAVIS

Sex:Female

:1965

External Reference #:[id number]





Demographics







                          Address                   [address]



                                                    [place] [address]

 

                          Home Phone                [phone]

 

                          Work Phone                [phone]

 

                          Mobile Phone              [phone]

 

                          Email Address             [email or web address]

 

                          Preferred Language        English

 

                          Marital Status            Unknown

 

                          Gnosticism Affiliation     Unknown

 

                          Race                      Unknown

 

                          Additional Race(s)        Unavailable



                                                    White

 

                          Ethnic Group              Not  or 









Author







                          Organization              [facility]

t

 

                          Address                   [address]



                                                    [place] [address]

 

                          Phone                     [phone]









Support







                Name            Relationship    Address         Phone

 

                NO, CONTACT     OT              [address]    [phone]



                                                [place] [address] 

 

                NO, CONTACT     OT              [address]    [phone]



                                                [place] [address] 

 

                Steve Cuevas  Spouse          Unavailable     [phone]

 

                Odell Fall   Mother          Unavailable     Unavailable

 

                Taz Davis   Spouse          Unavailable     [phone]









Care Team Providers







                    Name                Role                Phone

 

                    LIAM GREY      Primary Care Physician Unavailable

 

                    GLORY BLANCO      Attending Clinician Unavailable

 

                    Glory Blanco MD   Attending Clinician [phone]

 

                    Doctor Unassigned, No Name Attending Clinician Unavailable

 

                    HEYDI KNUTSON    Attending Clinician Unavailable

 

                    Heydi Knutson MD Attending Clinician [phone]

 

                    Gerardo Norwood MD  Attending Clinician [phone]

 

                    LIAM MORENO Attending Clinician Unavailable

 

                    Liam Moreno MD Attending Clinician [phone]

 

                    KAZ AGARWAL    Attending Clinician Unavailable

 

                    Kaz Kelley Attending Clinician [phone]

 

                    Merna Cortes MD Attending Clinician [phone]

 

                    MERNA CORTES   Attending Clinician Unavailable

 

                    GLORY BLANCO      Attending Clinician Unavailable

 

                    GERARDO NORWOOD     Attending Clinician Unavailable

 

                    RADIOLOGY           Attending Clinician Unavailable

 

                    Radiology           Attending Clinician Unavailable

 

                    Provider, [facility] Attending Clinician Unavailable

 

                    Shirlene Goyal Attending Clinician [phone]

 

                    Colton Mooney MD Attending Clinician [phone]

 

                    2, [facility]          Attending Clinician Unavailable

 

                    Orlando Marin DO Attending Clinician [phone]

 

                    COLTON MOONEY    Attending Clinician Unavailable

 

                    Lab, Pcp Covid      Attending Clinician Unavailable

 

                    [facility] Attending Clinician Unavailable

 

                    Tami Vieira  Attending Clinician [phone]

 

                    TAMI MURRY      Attending Clinician Unavailable

 

                    HEYDI KNUTSON    Admitting Clinician Unavailable

 

                    Heydi Knutson MD Admitting Clinician [phone]

 

                    LIAM MORENO Admitting Clinician Unavailable

 

                    KAZ AGARWAL    Admitting Clinician Unavailable

 

                    ADUMGLORY      Admitting Clinician Unavailable

 

                    ADUM, GLORY HARMON      Admitting Clinician Unavailable

 

                    LIAM GREY      Admitting Clinician Unavailable









Payers







           Payer Name Policy Type Policy Number Effective Date Expiration Date S

ekta

 

           [employer]            [id number] 2021            



                                            00:00:00              

 

           [employer]      2          [id number] 2023            



                                            00:00:00              

 

           COMMERCIAL            [id number]  [id number]            



           NON-CONTRACT                       00:00:00              



           GENERIC                                                







Problems







       Condition Condition Condition Status Onset  Resolution Last   Treating Co

mments 

Source



       Name   Details Category        Date   Date   Treatment Clinician        



                                                 Date                 

 

       Pain of Pain of Disease Active                       Overview: Univ

ers



       upper  upper                9-21                        [facility] it[facility] of



       abdomen abdomen               00:00:                      g of this [place]



                                   00                          note   Medical



                                                               might be Branch



                                                               different 



                                                               from the 



                                                               original. 



                                                               Added  



                                                               automatic 



                                                               ally from 



                                                               request 



                                                               for    



                                                               surgery 



                                                               [id number] 

 

       Essential Essential Disease Active 2020                             Uni

vers



       hypertensi hypertensi               1-18                               it

y of



       on     on                   00:00:                             [place]



                                   00                                 Medical



                                                                      Branch

 

       Class 1 Class 1 Disease Active 2020                             Univers



       obesity obesity               1-18                               ity of



       with   with                 00:00:                             [place]



       serious serious               00                                 Medical



       comorbidit comorbidit                                                  Br

anch



       y and body y and body                                                  



       mass index mass index                                                  



       (BMI) of (BMI) of                                                  



       33.0 to 33.0 to                                                  



       33.9 in 33.9 in                                                  



       adult, adult,                                                  



       unspecifie unspecifie                                                  



       d obesity d obesity                                                  



       type   type                                                    

 

       Need for Need for Disease Active 2020                             Unive

rs



       Tdap   Tdap                 1-18                               ity of



       vaccinatio vaccinatio               00:00:                             [facility]

[place]



       n      n                    00                                 Medical



                                                                      Branch

 

       Encounter Encounter Disease Active 2020                             Uni

vers



       for    for                  1-18                               ity of



       medical medical               00:00:                             [place]



       examinatio examinatio               00                                 Me

dical



       n to   n to                                                    Branch



       establish establish                                                  



       care   care                                                    

 

       Other  Other  Disease Active 2020                             Univers



       infectious infectious               1-18                               it

y of



       mononucleo mononucleo               00:00:                             [facility]

[place]



       sis    sis                  00                                 Medical



       without without                                                  Branch



       complicati complicati                                                  



       on     on                                                      

 

       Sore   Sore   Disease Active 2020-                             Univers



       throat throat               7-17                               ity of



                                   00:00:                             [place]



                                   00                                 Medical



                                                                      Branch

 

       Post-nasal Post-nasal Disease Active -                             U

nivers



       drip   drip                 7-17                               ity of



                                   00:00:                             [place]



                                   00                                 Medical



                                                                      Branch

 

       Cough  Cough  Disease Active 2020-                             Univers



                                   7-17                               ity of



                                   00:00:                             [place]



                                   00                                 Medical



                                                                      Branch

 

       Obesity Obesity Disease Active 2018                             Univers



       (BMI   (BMI                 2-27                               ity of



       30-39.9) 30-39.9)               00:00:                             [place]



                                   00                                 Medical



                                                                      Branch

 

       Family Family Disease Active 2018                             Univers



       history of history of               2-26                               it

y of



       early CAD early CAD               00:00:                             [address]







Allergies, Adverse Reactions, Alerts







       Allergy Allergy Status Severity Reaction(s) Onset  Inactive Treating Comm

ents 

Source



       Name   Type                        Date   Date   Clinician        

 

       NO KNOWN Drug   Active                                           Univers



       ALLERGIE Class                                                   ity of



       S                                                              [facility]







Social History







           Social Habit Start Date Stop Date  Quantity   Comments   Source

 

           Gender identity                                             [facility]



                                                                  [facility]

 

           Sexual orientation                                             Method

ist



                                                                  Hospital

 

           Exposure to 2023 Not sure              [place] of



           SARS-CoV-2 (event) 00:00:00   07:41:00                         [facility]

 

           Alcohol intake 2023 Current drinker            Unive

rsity of



                      00:00:00   00:00:00   of alcohol            [facility]



                                            (finding)             [place]

 

           Alcohol Comment 2022-10-24 2022-10-24 occasional            Universit

y of



                      00:00:00   00:00:00                         [facility]

 

           Tobacco use and 2022-09-15 2022-09-15 Smokeless tobacco            Un

iversity of



           exposure   00:00:00   00:00:00   non-user              [facility]

 

           History SDOH 2020-10-07 2020-10-07 2                     University o

f



           Alcohol Frequency 00:00:00   00:00:00                         [facility]

edical



                                                                  Branch

 

           History SDOH 2020-10-07 2020-10-07 99                    University o

f



           Alcohol Std Drinks 00:00:00   00:00:00                         [facility]

 

           History SDOH 2020-10-07 2020-10-07 99                    University o

f



           Alcohol Binge 00:00:00   00:00:00                         [place] [facility]

al



                                                                  Branch

 

           History of Social 2019                       Methodi

st



           function   00:00:00   00:00:00                         Hospital

 

           Sex Assigned At 1965                       [facility]



           Birth      00:00:00   00:00:00                         Hospital









                Smoking Status  Start Date      Stop Date       Source

 

                Never smoked tobacco                                 [facility]

 

                Tobacco smoking consumption                                 [facility]

[facility]



                unknown                                         







Medications







       Ordered Filled Start  Stop   Current Ordering Indication Dosage Frequency

 Signature

                    Comments            Components          Source



     Medication Medication Date Date Medication? Clinician                (SIG) 

          



     Name Name                                                   

 

     aspirin 81            Yes            81mg      Take 1           Unive

rs



     mg chewable      3-31                               tablet by           ity

 of



     tablet      08:22:                               mouth in           [place]



               23                                 the            Medical



                                                  morning.           Branch

 

     aspirin 81            Yes            81mg      Take 1           Unive

rs



     mg chewable      3-31                               tablet by           ity

 of



     tablet      08:22:                               mouth in           [place]



               23                                 the            Medical



                                                  morning.           Branch

 

     lactated      2022      Yes            1000mL      at 100           Unive

rs



     ringers IV      1-11                               mL/hr,           ity of



     infusion      18:15:                               1,000 mL,           [place]

[facility]



     1,000 mL      00                                 IV             Medical



                                                  Infusion,           Branch



                                                  CONTINUOUS           



                                                  , Starting           



                                                  on 22           



                                                  at 1215,           



                                                  Until           



                                                  Discontinu           



                                                  ed,            



                                                  Routine,           



                                                  PACU           

 

     lactated      2022- No             1000mL      at 100           Univ

ers



     ringers IV      1-11 11-11                          mL/hr,           ity of



     infusion      18:15: 22:21                          1,000 mL,           [facility]

as



     1,000 mL      00   :27                           IV             Medical



                                                  Infusion,           Branch



                                                  CONTINUOUS           



                                                  , Starting           



                                                  on 22           



                                                  at 1215,           



                                                  Until 22           



                                                  at 1621,           



                                                  Routine,           



                                                  PACU           

 

     HYDROmorphO      2022      Yes            .2mg      0.2 mg,           Uni

vers



     ne        1-11                               Slow IV           ity of



     (DILAUDID)      18:04:                               Push,           [place]



     injection      10                                 Q5MIN PRN,           Medi

nikos



     0.2 mg                                         10 doses,           Branch



                                                  Starting           



                                                  on 22           



                                                  at 1204,           



                                                  Until           



                                                  Discontinu           



                                                  ed,            



                                                  Routine,           



                                                  Pain           



                                                  (scale           



                                                  7-10),           



                                                  PACU<br>Us           



                                                  e approved           



                                                  by             



                                                  (Faculty):           



                                                  PACU USE           



                                                  -ANESTHESI           



                                                  A              



                                                  SERVICE-HY           



                                                  DROMORPHON           



                                                  E              



                                                  INJECTIONS           

 

     FENTanyl PF      2022      Yes            25ug      25 mcg,           Uni

vers



     (SUBLIMAZE      1-11                               Slow IV           ity of



     (PF))      18:04:                               Push,           [place]



     injection      10                                 Q5MIN PRN,           Medi

nikos



     25 mcg                                         4 doses,           Branch



                                                  Starting           



                                                  on 22           



                                                  at 1204,           



                                                  Until           



                                                  Discontinu           



                                                  ed,            



                                                  Routine,           



                                                  Pain           



                                                  (scale           



                                                  4-6), PACU           

 

     HYDROmorphO      2022- No             .2mg      0.2 mg,           Un

tamra



     ne                                  Slow IV           ity of



     (DILAUDID)      18:04: 22:21                          Push,           [place]



     injection      10   :27                           Q5MIN PRN,           Medi

nikos



     0.2 mg                                         10 doses,           Branch



                                                  Starting           



                                                  on 22           



                                                  at 1204,           



                                                  Until 22           



                                                  at 1621,           



                                                  Routine,           



                                                  Pain           



                                                  (scale           



                                                  7-10),           



                                                  PACU<br>Us           



                                                  e approved           



                                                  by             



                                                  (Faculty):           



                                                  PACU USE           



                                                  -ANESTHESI           



                                                  A              



                                                  SERVICE-HY           



                                                  DROMORPHON           



                                                  E              



                                                  INJECTIONS           

 

     FENTanyl PF      2022- No             25ug      25 mcg,           Un

tamra



     (SUBLIMAZE                                Slow IV           ity o

f



     (PF))      18:04: 22:21                          Push,           [place]



     injection      10   :27                           Q5MIN PRN,           Medi

nikos



     25 mcg                                         4 doses,           Branch



                                                  Starting           



                                                  on 22           



                                                  at 1204,           



                                                  Until 22           



                                                  at 1621,           



                                                  Routine,           



                                                  Pain           



                                                  (scale           



                                                  4-6), PACU           

 

     ondansetron      2022- No             4mg       4 mg, Slow          

 Univers



     (ZOFRAN                                IV Push,           ity of



     (PF))      18:04: 18:14                          PRN, 1           [place]



     injection 4      10   :00                           dose,           Medical



     mg                                           Starting           [facility]



                                                  on 22           



                                                  at 1204,           



                                                  Until           



                                                  Discontinu           



                                                  ed,            



                                                  Routine,           



                                                  Nausea and           



                                                  Vomiting           



                                                  (N/V),           



                                                  PACU           

 

     ondansetron      2022- No             4mg       4 mg, Slow          

 [facility]



     (ZOFRAN                                IV Push,           ity of



     (PF))      18:04: 18:14                          PRN, 1           [place]



     injection 4      10   :00                           dose,           [facility]



     mg                                           Starting           [facility]



                                                  on 22           



                                                  at 1204,           



                                                  Until           



                                                  Discontinu           



                                                  ed,            



                                                  Routine,           



                                                  Nausea and           



                                                  Vomiting           



                                                  (N/V),           



                                                  PACU           

 

     iohexoL      2022      Yes                      PRN,           [facility]



     (OMNIPAQUE                                     Starting           ity o

f



     300-50 mL))      14:44:                               on Fri           [place]

[facility]



     injection      00                                 22           [facility]

l



                                                  at 0844,           [facility]



                                                  Until           



                                                  Discontinu           



                                                  ed,            



                                                  Routine,           



                                                  Intra-op           

 

     sodium      2022      Yes                      PRN,           [facility]



     chloride                                     Starting           [facility] of



     0.9 %      14:44:                               on Fri           [place]



     irrigation      00                                 22           Med[facility]

al



     solution                                         at 0844,           [facility]



                                                  Until           



                                                  Discontinu           



                                                  ed,            



                                                  Intra-op           

 

     iohexoL      2022- No                       PRN,           [facility]



     (OMNIPAQUE                                Starting           ity 

of



     300-50 mL))      14:44: 22:21                          on Fri           [facility]

as



     injection      00   :27                           22           [facility]

l



                                                  at 0844,           [facility]



                                                  Until 22           



                                                  at 1621,           



                                                  Routine,           



                                                  Intra-op           

 

     sodium      2022- No                       PRN,           [facility]



     chloride                                Starting           [facility] of



     0.9 %      14:44: 22:21                          on Fri           [place]



     irrigation      00   :27                           22           Med[facility]

al



     solution                                         at 0844,           [facility]



                                                  Until 22           



                                                  at 1621,           



                                                  Intra-op           

 

     bupivacaine      2022      Yes                      PRN,           [facility]

s



     -epinephrin                                     Starting           ity 

of



     e-pf      14:43:                               on Fri           [place]



     (SENSORCAIN      00                                 22           Medi

[facility]



     E                                            at 0843,           [facility]



     W/EPINEPHRI                                         Intra-op           



     NE) 0.25                                                        



     %-1:200,000                                                        



     30 mL,                                                        



     lidocaine                                                        



     1% (PF)                                                        



     (XYLOCAINE)                                                        



     30 mL                                                        

 

     bupivacaine      2022- No                       PRN,           [facility]

rs



     -epinephrin                                Starting           ity

 of



     e-pf      14:43: 22:21                          on Fri           [place]



     (SENSORCAIN      00   :27                           22           Medi

[facility]



     E                                            at 0843,           [facility]



     W/EPINEPHRI                                         Intra-op           



     NE) 0.25                                                        



     %-1:200,000                                                        



     30 mL,                                                        



     lidocaine                                                        



     1% (PF)                                                        



     (XYLOCAINE)                                                        



     30 mL                                                        

 

     aspirin 81      2022      Yes            81mg      Take 81 mg           U

nivers



     mg chewable      -11                               by mouth           ity 

of



     tablet      14:21:                               in the           [place]



                                                morning.           [facility]



                                                                 [facility]

 

     aspirin 81      2022      Yes            81mg      Take 81 mg           U

nivers



     mg chewable      -11                               by mouth           ity 

of



     tablet      14:21:                               in the           [place]



               25                                 morning.           [facility]



                                                                 [facility]

 

     aspirin 81      2022      Yes            81mg      Take 81 mg           U

nivers



     mg chewable      1-11                               by mouth           ity 

of



     tablet      14:21:                               in the           [place]



               25                                 morning.           Medical



                                                                 [facility]

 

     aspirin 81      2022      Yes            81mg      Take 81 mg           [facility]

nivers



     mg chewable      1-11                               by mouth           ity 

of



     tablet      14:21:                               in the           [place]



               25                                 morning.           Medical



                                                                 [facility]

 

     aspirin 81      2022      Yes            81mg      Take 81 mg           [facility]

nivers



     mg chewable      1-11                               by mouth           ity 

of



     tablet      14:21:                               in the           [place]



               25                                 morning.           Medical



                                                                 Br[facility]

 

     aspirin 81      2022      Yes            81mg      Take 81 mg           U

nivers



     mg chewable      1-11                               by mouth           ity 

of



     tablet      14:21:                               in the           [place]



               25                                 morning.           [facility]



                                                                 [facility]

 

     aspirin 81      2022      Yes            81mg      Take 81 mg           U

nivers



     mg chewable      1-11                               by mouth           ity 

of



     tablet      14:21:                               in the           [place]



               25                                 morning.           Medical



                                                                 Branch

 

     lactated      2022- No             1000mL      at 42           [facility]

rs



     ringers IV      1-11 11-11                          mL/hr,           ity of



     infusion      14:15: 14:15                          1,000 mL,           [facility]

as



     1,000 mL      00   :00                           IV             Medical



                                                  Infusion,           [facility]



                                                  ONCE, 1           



                                                  dose, On           



                                                  22           



                                                  at 0815,           



                                                  Routine,           



                                                  DSU Pre-op           

 

     lactated      2022- No             1000mL      at 42           [facility]

rs



     ringers IV      1-11 11-11                          mL/hr,           ity of



     infusion      14:15: 14:15                          1,000 mL,           [facility]

as



     1,000 mL      00   :00                           IV             Medical



                                                  Infusion,           [facility]



                                                  ONCE, 1           



                                                  dose, On           



                                                  22           



                                                  at 0815,           



                                                  Routine,           



                                                  DSU Pre-op           

 

     ibuprofen      2022      Yes       [id number] 800mg      Take 1           [facility]

nivers



     800 mg      1-11                               tablet by           ity of



     tablet      00:00:                               mouth           [place]



               00                                 every 6           Medical



                                                  (six)           [facility]



                                                  hours as           



                                                  needed for           



                                                  Alternate           



                                                  with Norco           



                                                  for pain           



                                                  scale 4-6.           

 

     ibuprofen      2022      Yes       [id number] 800mg      Take 1           [facility]

nivers



     800 mg      1-11                               tablet by           ity of



     tablet      00:00:                               mouth           [place]



               00                                 every 6           Medical



                                                  (six)           [facility]



                                                  hours as           



                                                  needed for           



                                                  Alternate           



                                                  with Norco           



                                                  for pain           



                                                  scale 4-6.           

 

     ibuprofen      2022      Yes       [id number] 800mg      Take 1           [facility]

nivers



     800 mg      1-11                               tablet by           ity of



     tablet      00:00:                               mouth           [place]



               00                                 every 6           Medical



                                                  (six)           [facility]



                                                  hours as           



                                                  needed for           



                                                  Alternate           



                                                  with Norco           



                                                  for pain           



                                                  scale 4-6.           

 

     ibuprofen      2022      Yes       [id number] 800mg      Take 1           [facility]

nivers



     800 mg      1-11                               tablet by           ity of



     tablet      00:00:                               mouth           [place]



               00                                 every 6           Medical



                                                  (six)           [facility]



                                                  hours as           



                                                  needed for           



                                                  Alternate           



                                                  with Norco           



                                                  for pain           



                                                  scale 4-6.           

 

     ibuprofen      2022      Yes       [id number] 800mg      Take 1           [facility]

nivers



     800 mg      1-11                               tablet by           ity of



     tablet      00:00:                               mouth           [place]



               00                                 every 6           Medical



                                                  (six)           [facility]



                                                  hours as           



                                                  needed for           



                                                  Alternate           



                                                  with Norco           



                                                  for pain           



                                                  scale 4-6.           

 

     ibuprofen      2022      Yes       [id number] 800mg      Take 1           [facility]

nivers



     800 mg      1-11                               tablet by           ity of



     tablet      00:00:                               mouth           [place]



               00                                 every 6           Medical



                                                  (six)           [facility]



                                                  hours as           



                                                  needed for           



                                                  Alternate           



                                                  with Norco           



                                                  for pain           



                                                  scale 4-6.           

 

     ibuprofen      2022      Yes       [id number] 800mg      Take 1           [facility]

nivers



     800 mg      1-11                               tablet by           ity of



     tablet      00:00:                               mouth           [place]



               00                                 every 6           Medical



                                                  (six)           [facility]



                                                  hours as           



                                                  needed for           



                                                  Alternate           



                                                  with Norco           



                                                  for pain           



                                                  scale 4-6.           

 

     ibuprofen      2022      Yes       [id number] 800mg      Take 1           [facility]

nivers



     800 mg      1-11                               tablet by           ity of



     tablet      00:00:                               mouth           [place]



               00                                 every 6           Medical



                                                  (six)           [facility]



                                                  hours as           



                                                  needed for           



                                                  Alternate           



                                                  with Norco           



                                                  for pain           



                                                  scale 4-6.           

 

     ibuprofen      2022      Yes       [id number] 800mg      Take 1           [facility]

nivers



     800 mg      1-11                               tablet by           ity of



     tablet      00:00:                               mouth           [place]



               00                                 every 6           Medical



                                                  (six)           [facility]



                                                  hours as           



                                                  needed for           



                                                  Alternate           



                                                  with Norco           



                                                  for pain           



                                                  scale 4-6.           

 

     HYDROcodone      2022- No        4647 1{tbl}      Take 1           [facility]

n[facility]



     -acetaminop      1-11 11-19                          tablet by           it

y of



     hen (NORCO)      00:00: 05:59                          mouth           [place]

s



     5-325 mg      00   :00                           every 6           Medical



     tablet                                         (six)           [facility]



                                                  hours as           



                                                  needed for           



                                                  Pain           



                                                  (scale           



                                                  7-10) for           



                                                  up to 7           



                                                  days.           



                                                  Indication           



                                                  s: acute           



                                                  pain           

 

     HYDROcodone      2022- No        4647 1{tbl}      Take 1           [facility]

nivers



     -acetaminop      1-11 11-19                          tablet by           it

y of



     hen (NORCO)      00:00: 05:59                          mouth           [place]

[facility]



     5-325 mg      00   :00                           every 6           Medical



     tablet                                         (six)           [facility]



                                                  hours as           



                                                  needed for           



                                                  Pain           



                                                  (scale           



                                                  7-10) for           



                                                  up to 7           



                                                  days.           



                                                  Indication           



                                                  s: acute           



                                                  pain           

 

     water for      2022- No                       PRN,           [facility]



     irrigation      0-24 10-24                          Starting           ity 

of



     irrigation      18:03: 18:30                          on Mon           [place]

[facility]



     solution      00   :00                           10/24/22           Medical



                                                  at 1303,           [facility]



                                                  Until Mon           



                                                  10/24/22           



                                                  at 1330,           



                                                  Routine,           



                                                  Intra-op           

 

     simethicone      2022- No                       PRN,           Unive

rs



     (GAS RELIEF      0-24 10-24                          Starting           ity

 of



     (SIMETHICON      18:03: 18:30                          on Mon           [facility]

as



     E)) 40      00   :00                           10/24/22           Medical



     mg/0.6 mL                                         at 1303,           [facility]



     drops                                         Until Mon           



                                                  10/24/22           



                                                  at 1330,           



                                                  Routine,           



                                                  Intra-op           

 

     lactated      2022- No             1000mL      at 42           [facility]

rs



     ringers IV      0-24 10-24                          mL/hr,           ity of



     infusion      16:15: 16:13                          1,000 mL,           [facility]

as



     1,000 mL      00   :00                           IV             Medical



                                                  Infusion,           [facility]



                                                  ONCE, 1           



                                                  dose, On           



                                                  Mon            



                                                  10/24/22           



                                                  at 1115,           



                                                  Routine,           



                                                  DSU Pre-op           

 

     lactated      2022- No             1000mL      at 42           Un[facility]

rs



     ringers IV      0-24 10-24                          mL/hr,           ity of



     infusion      16:15: 16:13                          1,000 mL,           [facility]

as



     1,000 mL      00   :00                           IV             Medical



                                                  Infusion,           [facility]



                                                  ONCE, 1           



                                                  dose, On           



                                                  Mon            



                                                  10/24/22           



                                                  at 1115,           



                                                  Routine,           



                                                  [facility] Pre-op           

 

     aspirin 2022      Yes            81mg      Take 81 mg           U

nivers



     mg chewable      0-24                               by mouth           [facility] 

of



     tablet      14:21:                               in the           [place]



                                                morning.           Medical



                                                                 [facility]

 

     aspirin 81      2022      Yes            81mg      Take 81 mg           U

nivers



     mg chewable      0-24                               by mouth           ity 

of



     tablet      14:21:                               in the           [place]



                                                morning.           Medical



                                                                 [facility]

 

     aspirin 2022      Yes            81mg      Take 81 mg           U

nivers



     mg chewable      0-24                               by mouth           [facility] 

of



     tablet      14:21:                               in the           [place]



                                                morning.           [facility]



                                                                 [facility]

 

     aspirin 2022      Yes            81mg      Take 81 mg           U

nivers



     mg chewable      0-24                               by mouth           ity 

of



     tablet      14:21:                               in the           [place]



                                                morning.           Medical



                                                                 [facility]

 

     aspirin 81      2022      Yes            81mg      Take 81 mg           U

nivers



     mg chewable      0-24                               by mouth           ity 

of



     tablet      14:21:                               in the           [place]



                                                morning.           [facility]



                                                                 [facility]

 

     aspirin 81      -      Yes            81mg      Take 81 mg           U

nivers



     mg chewable      0-24                               by mouth           ity 

of



     tablet      14:21:                               in the           [place]



                                                morning.           Medical



                                                                 [facility]

 

     aspirin 81      2022      Yes            81mg      Take 81 mg           U

nivers



     mg chewable      0-24                               by mouth           ity 

of



     tablet      14:21:                               in the           [place]



                                                morning.           Medical



                                                                 [facility]

 

     aspirin 81      2022      Yes            81mg      Take 81 mg           U

nivers



     mg chewable      0-24                               by mouth           ity 

of



     tablet      14:21:                               in the           [place]



                                                morning.           Medical



                                                                 [facility]

 

     aspirin 81      2022      Yes            81mg      Take 81 mg           U

nivers



     mg chewable      0-24                               by mouth           ity 

of



     tablet      14:21:                               in the           [place]



                                                morning.           Medical



                                                                 [facility]

 

     FAMOTIDINE      2022      Yes       [id number]           TAKE 1           

[facility]



     40 mg      0-20                               TABLET BY           ity of



     tablet      00:00:                               [facility]



                                                EVERYDAY           Medical



                                                  AT BEDTIME           [facility]

 

     FAMOTIDINE      -      Yes       [id number]           TAKE 1           

[facility]



     40 mg      0-20                               TABLET BY           ity of



     tablet      00:00:                               [facility]



                                                EVERYDAY           Medical



                                                  AT BEDTIME           [facility]

 

     FAMOTIDINE      -      Yes       [id number]           TAKE 1           

[facility]



     40 mg      0-20                               TABLET BY           ity of



     tablet      00:00:                               [facility]



                                                EVERYDAY           Medical



                                                  AT BEDTIME           [facility]

 

     FAMOTIDINE      -      Yes       [id number]           TAKE 1           

[facility]



     40 mg      0-20                               TABLET BY           ity of



     tablet      00:00:                               [facility]



                                                EVERYDAY           Medical



                                                  AT BEDTIME           [facility]

 

     FAMOTIDINE      -      Yes       [id number]           TAKE 1           

[facility]



     40 mg      0-20                               TABLET BY           ity of



     tablet      00:00:                               [facility]



                                                EVERYDAY           Medical



                                                  AT BEDTIME           [facility]

 

     FAMOTIDINE      -      Yes       [id number]           TAKE 1           

[facility]



     40 mg      0-20                               TABLET BY           ity of



     tablet      00:00:                               [facility]



                                                EVERYDAY           Medical



                                                  AT BEDTIME           [facility]

 

     FAMOTIDINE      -      Yes       [id number]           TAKE 1           

[facility]



     40 mg      0-20                               TABLET BY           ity of



     tablet      00:00:                               [facility]



                                                EVERYDAY           Medical



                                                  AT BEDTIME           [facility]

 

     FAMOTIDINE      2022      Yes       [id number]           TAKE 1           

[facility]



     40 mg      0-20                               TABLET BY           ity of



     tablet      00:00:                               MOUTH           [place]



                                                EVERYDAY           Medical



                                                  AT BEDTIME           [facility]

 

     FAMOTIDINE      2022      Yes       [id number]           TAKE 1           

[facility]



     40 mg      0-20                               TABLET BY           ity of



     tablet      00:00:                               [facility]



                                                EVERYDAY           Medical



                                                  AT BEDTIME           [facility]

 

     FAMOTIDINE      2022      Yes       [id number]           TAKE 1           

[facility]



     40 mg      0-20                               TABLET BY           ity of



     tablet      00:00:                               [facility]



                                                EVERYDAY           Medical



                                                  AT BEDTIME           [facility]

 

     FAMOTIDINE      2022      Yes       [id number]           TAKE 1           

[facility]



     40 mg      0-20                               TABLET BY           ity of



     tablet      00:00:                               [facility]



                                                EVERYDAY           Medical



                                                  AT BEDTIME           [facility]

 

     FAMOTIDINE      2022      Yes       [id number]           TAKE 1           

[facility]



     40 mg      0-20                               TABLET BY           ity of



     tablet      00:00:                               [facility]



                                                EVERYDAY           Medical



                                                  AT BEDTIME           [facility]

 

     FAMOTIDINE      2022      Yes       [id number]           TAKE 1           

[facility]



     40 mg      0-20                               TABLET BY           ity of



     tablet      00:00:                               [facility]



                                                EVERYDAY           Medical



                                                  AT BEDTIME           [facility]

 

     FAMOTIDINE      2022      Yes       [id number]           TAKE 1           

[facility]



     40 mg      0-20                               TABLET BY           ity of



     tablet      00:00:                               [facility]



                                                EVERYDAY           Medical



                                                  AT BEDTIME           [facility]

 

     FAMOTIDINE      2022      Yes       [id number]           TAKE 1           

[facility]



     40 mg      0-20                               TABLET BY           ity of



     tablet      00:00:                               [facility]



                                                EVERYDAY           Medical



                                                  AT BEDTIME           [facility]

 

     FAMOTIDINE      2022      Yes       [id number]           TAKE 1           

[facility]



     40 mg      0-20                               TABLET BY           ity of



     tablet      00:00:                               [facility]



                                                EVERYDAY           Medical



                                                  AT BEDTIME           [facility]

 

     FAMOTIDINE      2022      Yes       [id number]           TAKE 1           

[facility]



     40 mg      0-20                               TABLET BY           ity of



     tablet      00:00:                               [facility]



                                                EVERYDAY           Medical



                                                  AT BEDTIME           [facility]

 

     FAMOTIDINE      2022      Yes       [id number]           TAKE 1           

[facility]



     40 mg      0-20                               TABLET BY           ity of



     tablet      00:00:                               [facility]



                                                EVERYDAY           Medical



                                                  AT BEDTIME           [facility]

 

     FAMOTIDINE      2022      Yes       [id number]           TAKE 1           

[facility]



     40 mg      0-20                               TABLET BY           ity of



     tablet      00:00:                               [facility]



                                                EVERYDAY           Medical



                                                  AT BEDTIME           [facility]

 

     aspirin 81      -      Yes            81mg      Take 81 mg           U

nivers



     mg chewable      0-19                               by mouth           ity 

of



     tablet      09:41:                               in the           [place]



               16                                 morning.           Medical



                                                                 [facility]

 

     iopamidol      2022- No        [id number] 80mL      80 mL,           U

nivers



     (ISOVUE                                Intravenou           ity o

f



     370-500 mL)      13:07: 13:08                          s, ONCE, 1          

 [place]



     injection      00   :00                           dose, On           Medica

l



     80 mL                                         Mon            [facility]



                                                  22 at           



                                                  0830,           



                                                  Routine           

 

     budesonide-      0      Yes       [id number] 2{puff}      Inhale 2     

      [facility]



     formoteroL      4-25                               Puffs 2           ity of



     (SYMBICORT)      00:00:                               (two)           [place]



     80-4.5      00                                 times           Medical



     mcg/actuati                                         daily.           [facility]



     on inhaler                                                        

 

     lidocaine            Yes       [id number] 15mL      Take 15 mL        

   [facility]



     2% viscous      4-25                               by mouth           ity o

f



     (LIDOCAINE      00:00:                               every 4           [place]

[facility]



     VISCOUS) 2      00                                 (four)           Medical



     % solution                                         hours as           [facility]

h



                                                  needed for           



                                                  Oral           



                                                  mucosal           



                                                  pain.           

 

     pantoprazol            Yes       [id number] 40mg      Take 1          

 [facility]



     e         4-25                               tablet by           ity of



     (PROTONIX)      00:00:                               mouth           [place]



     40 mg EC      00                                 daily.           Medical



     tablet                                                        [facility]

 

     losartan 25      0      Yes            25mg      Take 25 mg           

[facility]



     mg tablet      4-25                               by mouth           ity of



               00:00:                               daily.           [place]



                                                               Medical



                                                                 [facility]

 

     budesonide-      0      Yes       [id number] 2{puff}      Inhale 2     

      [facility]



     formoteroL      4-25                               Puffs 2           ity of



     (SYMBICORT)      00:00:                               (two)           [place]



     80-4.5      00                                 times           Medical



     mcg/actuati                                         daily.           [facility]



     on inhaler                                                        

 

     lidocaine      0      Yes       [id number] 15mL      Take 15 mL        

   [facility]



     2% viscous      4-25                               by mouth           ity o

f



     (LIDOCAINE      00:00:                               every 4           [place]

[facility]



     VISCOUS) 2      00                                 (four)           Medical



     % solution                                         hours as           [facility]

h



                                                  needed for           



                                                  Oral           



                                                  mucosal           



                                                  pain.           

 

     pantoprazol      0      Yes       [id number] 40mg      Take 1          

 [facility]



     e         4-25                               tablet by           ity of



     (PROTONIX)      00:00:                               mouth           [place]



     40 mg EC      00                                 daily.           Medical



     tablet                                                        [facility]

 

     losartan 25      0      Yes            25mg      Take 25 mg           

[facility]



     mg tablet      4-25                               by mouth           ity of



               00:00:                               daily.           [place]



                                                               Medical



                                                                 [facility]

 

     budesonide-      0      Yes       [id number] 2{puff}      Inhale 2     

      [facility]



     formoteroL      4-25                               Puffs 2           ity of



     (SYMBICORT)      00:00:                               (two)           [place]



     80-4.5      00                                 times           Medical



     mcg/actuati                                         daily.           [facility]



     on inhaler                                                        

 

     lidocaine      -0      Yes       [id number] 15mL      Take 15 mL        

   [facility]



     2% viscous      4-25                               by mouth           ity o

f



     (LIDOCAINE      00:00:                               every 4           [place]

s



     VISCOUS) 2      00                                 (four)           Medical



     % solution                                         hours as           [facility]

h



                                                  needed for           



                                                  Oral           



                                                  mucosal           



                                                  pain.           

 

     pantoprazol      0      Yes       [id number] 40mg      Take 1          

 [facility]



     e         4-25                               tablet by           ity of



     (PROTONIX)      00:00:                               mouth           [place]



     40 mg EC      00                                 daily.           Medical



     tablet                                                        [facility]

 

     losartan 25      0      Yes            25mg      Take 25 mg           

[facility]



     mg tablet      4-25                               by mouth           ity of



               00:00:                               daily.           [place]



               00                                                Medical



                                                                 [facility]

 

     budesonide-            Yes       [id number] 2{puff}      Inhale 2     

      [facility]



     formoteroL      4-25                               Puffs 2           ity of



     (SYMBICORT)      00:00:                               (two)           [place]



     80-4.5      00                                 times           Medical



     mcg/actuati                                         daily.           [facility]



     on inhaler                                                        

 

     lidocaine      0      Yes       [id number] 15mL      Take 15 mL        

   [facility]



     2% viscous      4-25                               by mouth           ity o

f



     (LIDOCAINE      00:00:                               every 4           [place]

[facility]



     VISCOUS) 2      00                                 (four)           Medical



     % solution                                         hours as           [facility]

h



                                                  needed for           



                                                  Oral           



                                                  mucosal           



                                                  pain.           

 

     pantoprazol      0      Yes       [id number] 40mg      Take 1          

 [facility]



     e         4-25                               tablet by           ity of



     (PROTONIX)      00:00:                               mouth           [place]



     40 mg EC      00                                 daily.           Medical



     tablet                                                        [facility]

 

     losartan 25      0      Yes            25mg      Take 25 mg           

[facility]



     mg tablet      4-25                               by mouth           ity of



               00:00:                               daily.           [place]



               00                                                Medical



                                                                 [facility]

 

     budesonide-      0      Yes       [id number] 2{puff}      Inhale 2     

      [facility]



     formoteroL      4-25                               Puffs 2           ity of



     (SYMBICORT)      00:00:                               (two)           [place]



     80-4.5      00                                 times           Medical



     mcg/actuati                                         daily.           [facility]



     on inhaler                                                        

 

     lidocaine      -0      Yes       [id number] 15mL      Take 15 mL        

   [facility]



     2% viscous      4-25                               by mouth           ity o

f



     (LIDOCAINE      00:00:                               every 4           [place]

s



     VISCOUS) 2      00                                 (four)           Medical



     % solution                                         hours as           [facility]

h



                                                  needed for           



                                                  Oral           



                                                  mucosal           



                                                  pain.           

 

     pantoprazol      -0      Yes       [id number] 40mg      Take 1          

 [facility]



     e         4-25                               tablet by           ity of



     (PROTONIX)      00:00:                               mouth           [place]



     40 mg EC      00                                 daily.           Medical



     tablet                                                        [facility]

 

     losartan 25      0      Yes            25mg      Take 25 mg           

[facility]



     mg tablet      4-25                               by mouth           ity of



               00:00:                               daily.           [place]



                                                               Medical



                                                                 [facility]

 

     budesonide-      0      Yes       [id number] 2{puff}      Inhale 2     

      [facility]



     formoteroL      4-25                               Puffs 2           ity of



     (SYMBICORT)      00:00:                               (two)           [place]



     80-4.5      00                                 times           Medical



     mcg/actuati                                         daily.           [facility]



     on inhaler                                                        

 

     lidocaine      -0      Yes       [id number] 15mL      Take 15 mL        

   [facility]



     2% viscous      4-25                               by mouth           ity o

f



     (LIDOCAINE      00:00:                               every 4           [place]

s



     VISCOUS) 2      00                                 (four)           Medical



     % solution                                         hours as           [facility]

h



                                                  needed for           



                                                  Oral           



                                                  mucosal           



                                                  pain.           

 

     pantoprazol      -0      Yes       [id number] 40mg      Take 1          

 [facility]



     e         4-25                               tablet by           ity of



     (PROTONIX)      00:00:                               mouth           [place]



     40 mg EC      00                                 daily.           Medical



     tablet                                                        [facility]

 

     losartan 25      0      Yes            25mg      Take 25 mg           

[facility]



     mg tablet      4-25                               by mouth           ity of



               00:00:                               daily.           [place]



                                                               [facility]



                                                                 [facility]

 

     budesonide-      0      Yes       [id number] 2{puff}      Inhale 2     

      [facility]



     formoteroL      4-25                               Puffs 2           [facility]



     (SYMBICORT)      00:00:                               (two)           [place]



     80-4.5      00                                 times           Medical



     mcg/actuati                                         daily.           [facility]



     on inhaler                                                        

 

     lidocaine      -0      Yes       [id number] 15mL      Take 15 mL        

   [facility]



     2% viscous      4-25                               by mouth           [facility]

f



     (LIDOCAINE      00:00:                               every 4           [place]

[facility]



     VISCOUS) 2      00                                 (four)           Medical



     % solution                                         hours as           [facility]

h



                                                  needed for           



                                                  Oral           



                                                  mucosal           



                                                  pain.           

 

     pantoprazol      -0      Yes       [id number] 40mg      Take 1          

 [facility]



     e         4-25                               tablet by           [facility]



     (PROTONIX)      00:00:                               mouth           [place]



     40 mg EC      00                                 daily.           Medical



     tablet                                                        [facility]

 

     losartan 25      -0      Yes            25mg      Take 1           [facility]

ers



     mg tablet      4-25                               tablet by           [facility]

f



               00:00:                               mouth in           [place]



               00                                 the            Medical



                                                  morning.           [facility]

 

     budesonide-      0      Yes       [id number] 2{puff}      Inhale 2     

      [facility]



     formoteroL      4-25                               Puffs 2           [facility]



     (SYMBICORT)      00:00:                               (two)           [place]



     80-4.5      00                                 times           Medical



     mcg/actuati                                         daily.           [facility]



     on inhaler                                                        

 

     lidocaine      -0      Yes       [id number] 15mL      Take 15 mL        

   [facility]



     2% viscous      4-25                               by mouth           [facility]

f



     (LIDOCAINE      00:00:                               every 4           [place]

[facility]



     VISCOUS) 2      00                                 (four)           Medical



     % solution                                         hours as           [facility]

h



                                                  needed for           



                                                  Oral           



                                                  mucosal           



                                                  pain.           

 

     pantoprazol      -0      Yes       [id number] 40mg      Take 1          

 [facility]



     e         4-25                               tablet by           [facility]



     (PROTONIX)      00:00:                               mouth           [place]



     40 mg EC      00                                 daily.           [facility]



     tablet                                                        [facility]

 

     losartan 25      0      Yes            25mg      Take 1           [facility]

ers



     mg tablet      4-25                               tablet by           [facility]

f



               00:00:                               mouth in           [place]



               00                                 the            Medical



                                                  morning.           [facility]

 

     budesonide-      -      Yes       [id number] 2{puff}      Inhale 2     

      [facility]



     formoteroL      4-25                               Puffs 2           [facility]



     (SYMBICORT)      00:00:                               (two)           [place]



     80-4.5      00                                 times           Medical



     mcg/actuati                                         daily.           [facility]



     on inhaler                                                        

 

     lidocaine      -0      Yes       [id number] 15mL      Take 15 mL        

   [facility]



     2% viscous      4-25                               by mouth           [facility]

f



     (LIDOCAINE      00:00:                               every 4           [place]

[facility]



     VISCOUS) 2      00                                 (four)           Medical



     % solution                                         hours as           [facility]

h



                                                  needed for           



                                                  Oral           



                                                  mucosal           



                                                  pain.           

 

     pantoprazol      -0      Yes       [id number] 40mg      Take 1          

 [facility]



     e         4-25                               tablet by           [facility]



     (PROTONIX)      00:00:                               mouth           [place]



     40 mg EC      00                                 daily.           [facility]



     tablet                                                        [facility]

 

     famotidine      -0      Yes       [id number] 40mg      Take 1           

[facility]



     40 mg      4-25                               tablet by           [facility]



     tablet      00:00:                               mouth at           [place]



               00                                 bedtime.           Medical



                                                                 [facility]

 

     losartan 25      -0      Yes            25mg      Take 25 mg           

[facility]



     mg tablet      4-25                               by mouth           [facility]



               00:00:                               daily.           [place]



               00                                                Medical



                                                                 [facility]

 

     budesonide-      -0      Yes       [id number] 2{puff}      Inhale 2     

      [facility]



     formoteroL      4-25                               Puffs 2           [facility]



     (SYMBICORT)      00:00:                               (two)           [place]



     80-4.5      00                                 times           Medical



     mcg/actuati                                         daily.           [facility]



     on inhaler                                                        

 

     lidocaine      0      Yes       [id number] 15mL      Take 15 mL        

   [facility]



     2% viscous      4-25                               by mouth           [facility]

f



     (LIDOCAINE      00:00:                               every 4           [place]

[facility]



     VISCOUS) 2      00                                 (four)           Medical



     % solution                                         hours as           [facility]

h



                                                  needed for           



                                                  Oral           



                                                  mucosal           



                                                  pain.           

 

     pantoprazol      0      Yes       [id number] 40mg      Take 1          

 [facility]



     e         4-25                               tablet by           [facility]



     (PROTONIX)      00:00:                               mouth           [place]



     40 mg EC      00                                 daily.           [facility]



     tablet                                                        [facility]

 

     famotidine      0      Yes       [id number] 40mg      Take 1           

[facility]



     40 mg      4-25                               tablet by           [facility]



     tablet      00:00:                               mouth at           [place]



               00                                 bedtime.           Medical



                                                                 [facility]

 

     losartan 25      0      Yes            25mg      Take 25 mg           

[facility]



     mg tablet      4-25                               by mouth           [facility]



               00:00:                               daily.           [place]



                                                               [facility]



                                                                 [facility]

 

     budesonide-            Yes       [id number] 2{puff}      Inhale 2     

      [facility]



     formoteroL      4-25                               Puffs 2           [facility]



     (SYMBICORT)      00:00:                               (two)           [place]



     80-4.5      00                                 times           Medical



     mcg/actuati                                         daily.           [facility]



     on inhaler                                                        

 

     lidocaine            Yes       [id number] 15mL      Take 15 mL        

   [facility]



     2% viscous      4-25                               by mouth           [facility]

f



     (LIDOCAINE      00:00:                               every 4           [place]

[facility]



     VISCOUS) 2      00                                 (four)           Medical



     % solution                                         hours as           [facility]

h



                                                  needed for           



                                                  Oral           



                                                  mucosal           



                                                  pain.           

 

     pantoprazol      0      Yes       [id number] 40mg      Take 1          

 [facility]



     e         4-25                               tablet by           [facility]



     (PROTONIX)      00:00:                               mouth           [place]



     40 mg EC      00                                 daily.           [facility]



     tablet                                                        [facility]

 

     famotidine      0      Yes       [id number] 40mg      Take 1           

[facility]



     40 mg      4-25                               tablet by           [facility]



     tablet      00:00:                               mouth at           [place]



               00                                 bedtime.           Medical



                                                                 [facility]

 

     losartan 25      -0      Yes            25mg      Take 25 mg           

[facility]



     mg tablet      4-25                               by mouth           [facility]



               00:00:                               daily.           [place]



                                                               [facility]



                                                                 [facility]

 

     budesonide-      -0      Yes       [id number] 2{puff}      Inhale 2     

      [facility]



     formoteroL      4-25                               Puffs 2           [facility]



     (SYMBICORT)      00:00:                               (two)           [place]



     80-4.5      00                                 times           Medical



     mcg/actuati                                         daily.           [facility]



     on inhaler                                                        

 

     lidocaine            Yes       [id number] 15mL      Take 15 mL        

   [facility]



     2% viscous      4-25                               by mouth           [facility]

f



     (LIDOCAINE      00:00:                               every 4           [place]

[facility]



     VISCOUS) 2      00                                 (four)           [facility]



     % solution                                         hours as           [facility]

h



                                                  needed for           



                                                  Oral           



                                                  mucosal           



                                                  pain.           

 

     pantoprazol      0      Yes       [id number] 40mg      Take 1          

 [facility]



     e         4-25                               tablet by           [facility]



     (PROTONIX)      00:00:                               mouth           [place]



     40 mg EC      00                                 daily.           [facility]



     tablet                                                        [facility]

 

     famotidine      0      Yes       [id number] 40mg      Take 1           

[facility]



     40 mg      4-25                               tablet by           [facility]



     tablet      00:00:                               mouth at           [place]



               00                                 bedtime.           Medical



                                                                 [facility]

 

     losartan 25      0      Yes            25mg      Take 25 mg           

[facility]



     mg tablet      4-25                               by mouth           [facility]



               00:00:                               daily.           [place]



                                                               [facility]



                                                                 [facility]

 

     budesonide-            Yes       [id number] 2{puff}      Inhale 2     

      [facility]



     formoteroL      4-25                               Puffs 2           [facility]



     (SYMBICORT)      00:00:                               (two)           [place]



     80-4.5      00                                 times           [facility]



     mcg/actuati                                         daily.           [facility]



     on inhaler                                                        

 

     lidocaine            Yes       [id number] 15mL      Take 15 mL        

   [facility]



     2% viscous      4-25                               by mouth           [facility]

f



     (LIDOCAINE      00:00:                               every 4           [place]

[facility]



     VISCOUS) 2      00                                 (four)           [facility]



     % solution                                         hours as           [facility]

h



                                                  needed for           



                                                  Oral           



                                                  mucosal           



                                                  pain.           

 

     pantoprazol            Yes       [id number] 40mg      Take 1          

 [facility]



     e         4-25                               tablet by           [facility]



     (PROTONIX)      00:00:                               mouth           [place]



     40 mg EC      00                                 daily.           [facility]



     tablet                                                        [facility]

 

     famotidine      0      Yes       [id number] 40mg      Take 1           

[facility]



     40 mg      4-25                               tablet by           [facility]



     tablet      00:00:                               mouth at           [place]



               00                                 bedtime.           Medical



                                                                 [facility]

 

     losartan 25      0      Yes            25mg      Take 25 mg           

[facility]



     mg tablet      4-25                               by mouth           [facility]



               00:00:                               daily.           [place]



                                                               [facility]



                                                                 [facility]

 

     budesonide-      0      Yes       [id number] 2{puff}      Inhale 2     

      [facility]



     formoteroL      4-25                               Puffs 2           [facility]



     (SYMBICORT)      00:00:                               (two)           [place]



     80-4.5      00                                 times           Medical



     mcg/actuati                                         daily.           [facility]



     on inhaler                                                        

 

     lidocaine      2022-0      Yes       [id number] 15mL      Take 15 mL        

   [facility]



     2% viscous      4-25                               by mouth           [facility]

f



     (LIDOCAINE      00:00:                               every 4           [place]

[facility]



     VISCOUS) 2      00                                 (four)           [facility]



     % solution                                         hours as           [facility]

h



                                                  needed for           



                                                  Oral           



                                                  mucosal           



                                                  pain.           

 

     pantoprazol      0      Yes       [id number] 40mg      Take 1          

 [facility]



     e         4-25                               tablet by           [facility]



     (PROTONIX)      00:00:                               mouth           [place]



     40 mg EC      00                                 daily.           [facility]



     tablet                                                        [facility]

 

     famotidine      0      Yes       [id number] 40mg      Take 1           

[facility]



     40 mg      4-25                               tablet by           [facility]



     tablet      00:00:                               mouth at           [place]



               00                                 bedtime.           Medical



                                                                 [facility]

 

     losartan 25      0      Yes            25mg      Take 25 mg           

[facility]



     mg tablet      4-25                               by mouth           [facility]



               00:00:                               daily.           [place]



                                                               [facility]



                                                                 [facility]

 

     budesonide-            Yes       [id number] 2{puff}      Inhale 2     

      [facility]



     formoteroL      4-25                               Puffs 2           [facility]



     (SYMBICORT)      00:00:                               (two)           [place]



     80-4.5      00                                 times           Medical



     mcg/actuati                                         daily.           [facility]



     on inhaler                                                        

 

     lidocaine            Yes       [id number] 15mL      Take 15 mL        

   [facility]



     2% viscous      4-25                               by mouth           [facility]

f



     (LIDOCAINE      00:00:                               every 4           [place]

[facility]



     VISCOUS) 2      00                                 (four)           [facility]



     % solution                                         hours as           [facility]

h



                                                  needed for           



                                                  Oral           



                                                  mucosal           



                                                  pain.           

 

     pantoprazol            Yes       [id number] 40mg      Take 1          

 [facility]



     e         4-25                               tablet by           [facility]



     (PROTONIX)      00:00:                               mouth           [place]



     40 mg EC      00                                 daily.           [facility]



     tablet                                                        [facility]

 

     famotidine      0      Yes       [id number] 40mg      Take 1           

[facility]



     40 mg      4-25                               tablet by           [facility]



     tablet      00:00:                               mouth at           [place]



               00                                 bedtime.           Medical



                                                                 [facility]

 

     losartan 25      0      Yes            25mg      Take 25 mg           

[facility]



     mg tablet      4-25                               by mouth           [facility]



               00:00:                               daily.           [place]



                                                               [facility]



                                                                 [facility]

 

     budesonide-      0      Yes       [id number] 2{puff}      Inhale 2     

      [facility]



     formoteroL      4-25                               Puffs 2           [facility]



     (SYMBICORT)      00:00:                               (two)           [place]



     80-4.5      00                                 times           Medical



     mcg/actuati                                         daily.           [facility]



     on inhaler                                                        

 

     lidocaine      0      Yes       [id number] 15mL      Take 15 mL        

   [facility]



     2% viscous      4-25                               by mouth           [facility]

f



     (LIDOCAINE      00:00:                               every 4           [place]

[facility]



     VISCOUS) 2      00                                 (four)           [facility]



     % solution                                         hours as           [facility]

h



                                                  needed for           



                                                  Oral           



                                                  mucosal           



                                                  pain.           

 

     pantoprazol      0      Yes       [id number] 40mg      Take 1          

 [facility]



     e         4-25                               tablet by           [facility]



     (PROTONIX)      00:00:                               mouth           [place]



     40 mg EC      00                                 daily.           [facility]



     tablet                                                        [facility]

 

     famotidine      0      Yes       [id number] 40mg      Take 1           

[facility]



     40 mg      4-25                               tablet by           [facility]



     tablet      00:00:                               mouth at           [place]



               00                                 bedtime.           Medical



                                                                 [facility]

 

     losartan 25      0      Yes            25mg      Take 25 mg           

[facility]



     mg tablet      4-25                               by mouth           [facility]



               00:00:                               daily.           [place]



                                                               [facility]



                                                                 [facility]

 

     budesonide-            Yes       [id number] 2{puff}      Inhale 2     

      [facility]



     formoteroL      4-25                               Puffs 2           [facility]



     (SYMBICORT)      00:00:                               (two)           [place]



     80-4.5      00                                 times           [facility]



     mcg/actuati                                         daily.           [facility]



     on inhaler                                                        

 

     lidocaine            Yes       [id number] 15mL      Take 15 mL        

   [facility]



     2% viscous      4-25                               by mouth           [facility]

f



     (LIDOCAINE      00:00:                               every 4           [facility]

s



     VISCOUS) 2      00                                 (four)           [facility]



     % solution                                         hours as           [facility]

h



                                                  needed for           



                                                  Oral           



                                                  mucosal           



                                                  pain.           

 

     pantoprazol      0      Yes       [id number] 40mg      Take 1          

 [facility]



     e         4-25                               tablet by           [facility]



     (PROTONIX)      00:00:                               mouth           [place]



     40 mg EC      00                                 daily.           [facility]



     tablet                                                        [facility]

 

     famotidine      0      Yes       [id number] 40mg      Take 1           

[facility]



     40 mg      4-25                               tablet by           [facility]



     tablet      00:00:                               mouth at           [place]



               00                                 bedtime.           [facility]



                                                                 [facility]

 

     losartan 25      0      Yes            25mg      Take 25 mg           

[facility]



     mg tablet      4-25                               by mouth           [facility]



               00:00:                               daily.           [place]



               00                                                [facility]



                                                                 [facility]

 

     budesonide-      0      Yes       [id number] 2{puff}      Inhale 2     

      [facility]



     formoteroL      4-25                               Puffs 2           [facility]



     (SYMBICORT)      00:00:                               (two)           [place]



     80-4.5      00                                 times           [facility]



     mcg/actuati                                         daily.           [facility]



     on inhaler                                                        

 

     lidocaine      -0      Yes       [id number] 15mL      Take 15 mL        

   [facility]



     2% viscous      4-25                               by mouth           [facility]

f



     (LIDOCAINE      00:00:                               every 4           [facility]

s



     VISCOUS) 2      00                                 (four)           [facility]



     % solution                                         hours as           [facility]

h



                                                  needed for           



                                                  Oral           



                                                  mucosal           



                                                  pain.           

 

     pantoprazol      -0      Yes       [id number] 40mg      Take 1          

 [facility]



     e         4-25                               tablet by           [facility]



     (PROTONIX)      00:00:                               mouth           [place]



     40 mg EC      00                                 daily.           [facility]



     tablet                                                        [facility]

 

     famotidine      -0      Yes       [id number] 40mg      Take 1           

[facility]



     40 mg      4-25                               tablet by           [facility]



     tablet      00:00:                               mouth at           [place]



               00                                 bedtime.           [facility]



                                                                 [facility]

 

     losartan 25      -0      Yes            25mg      Take 25 mg           

[facility]



     mg tablet      4-25                               by mouth           [facility]



               00:00:                               daily.           [place]



                                                               [facility]



                                                                 [facility]

 

     budesonide-      -0      Yes       [id number] 2{puff}      Inhale 2     

      [facility]



     formoteroL      4-25                               Puffs 2           [facility]



     (SYMBICORT)      00:00:                               (two)           [place]



     80-4.5      00                                 times           [facility]



     mcg/actuati                                         daily.           [facility]



     on inhaler                                                        

 

     lidocaine      -0      Yes       [id number] 15mL      Take 15 mL        

   [facility]



     2% viscous      4-25                               by mouth           [facility]

f



     (LIDOCAINE      00:00:                               every 4           [facility]

[facility]



     VISCOUS) 2      00                                 (four)           [facility]



     % solution                                         hours as           [facility]

h



                                                  needed for           



                                                  Oral           



                                                  mucosal           



                                                  pain.           

 

     pantoprazol      -0      Yes       [id number] 40mg      Take 1          

 [facility]



     e         4-25                               tablet by           [facility]



     (PROTONIX)      00:00:                               mouth           [place]



     40 mg EC      00                                 daily.           [facility]



     tablet                                                        [facility]

 

     famotidine      0      Yes       [id number] 40mg      Take 1           

[facility]



     40 mg      4-25                               tablet by           [facility]



     tablet      00:00:                               mouth at           [place]



               00                                 bedtime.           [facility]



                                                                 [facility]

 

     losartan 25      -0      Yes            25mg      Take 25 mg           

[facility]



     mg tablet      4-25                               by mouth           [facility]



               00:00:                               daily.           [place]



               00                                                [facility]



                                                                 [facility]

 

     budesonide-      -0      Yes       [id number] 2{puff}      Inhale 2     

      [facility]



     formoteroL      4-25                               Puffs 2           [facility]



     (SYMBICORT)      00:00:                               (two)           [place]



     80-4.5      00                                 times           [facility]



     mcg/actuati                                         daily.           [facility]



     on inhaler                                                        

 

     lidocaine      -0      Yes       [id number] 15mL      Take 15 mL        

   [facility]



     2% viscous      4-25                               by mouth           [facility]

f



     (LIDOCAINE      00:00:                               every 4           [facility]

[facility]



     VISCOUS) 2      00                                 (four)           [facility]



     % solution                                         hours as           [facility]

h



                                                  needed for           



                                                  Oral           



                                                  mucosal           



                                                  pain.           

 

     pantoprazol      0      Yes       [id number] 40mg      Take 1          

 [facility]



     e         4-25                               tablet by           [facility]



     (PROTONIX)      00:00:                               mouth           [place]



     40 mg EC      00                                 daily.           [facility]



     tablet                                                        [facility]

 

     famotidine      0      Yes       [id number] 40mg      Take 1           

[facility]



     40 mg      4-25                               tablet by           [facility]



     tablet      00:00:                               mouth at           [place]



               00                                 bedtime.           [facility]



                                                                 [facility]

 

     losartan 25      0      Yes            25mg      Take 25 mg           

[facility]



     mg tablet      4-25                               by mouth           [facility]



               00:00:                               daily.           [place]



               00                                                [facility]



                                                                 [facility]

 

     budesonide-            Yes       [id number] 2{puff}      Inhale 2     

      [facility]



     formoteroL      4-25                               Puffs 2           [facility]



     (SYMBICORT)      00:00:                               (two)           [place]



     80-4.5      00                                 times           [facility]



     mcg/actuati                                         daily.           [facility]



     on inhaler                                                        

 

     lidocaine            Yes       [id number] 15mL      Take 15 mL        

   [facility]



     2% viscous      4-25                               by mouth           [facility]

f



     (LIDOCAINE      00:00:                               every 4           [facility]

[facility]



     VISCOUS) 2      00                                 (four)           [facility]



     % solution                                         hours as           [facility]

h



                                                  needed for           



                                                  Oral           



                                                  mucosal           



                                                  pain.           

 

     pantoprazol      0      Yes       [id number] 40mg      Take 1          

 [facility]



     e         4-25                               tablet by           [facility]



     (PROTONIX)      00:00:                               mouth           [place]



     40 mg EC      00                                 daily.           [facility]



     tablet                                                        [facility]

 

     losartan 25      0      Yes            25mg      Take 25 mg           

[facility]



     mg tablet      4-25                               by mouth           [facility]



               00:00:                               daily.           [place]



                                                               [facility]



                                                                 [facility]

 

     budesonide-            Yes       [id number] 2{puff}      Inhale 2     

      [facility]



     formoteroL      4-25                               Puffs 2           [facility]



     (SYMBICORT)      00:00:                               (two)           [place]



     80-4.5      00                                 times           [facility]



     mcg/actuati                                         daily.           [facility]



     on inhaler                                                        

 

     lidocaine      -0      Yes       [id number] 15mL      Take 15 mL        

   [facility]



     2% viscous      4-25                               by mouth           [facility]

f



     (LIDOCAINE      00:00:                               every 4           [facility]

[facility]



     VISCOUS) 2      00                                 (four)           [facility]



     % solution                                         hours as           [facility]

h



                                                  needed for           



                                                  Oral           



                                                  mucosal           



                                                  pain.           

 

     pantoprazol      -0      Yes       [id number] 40mg      Take 1          

 [facility]



     e         4-25                               tablet by           [facility]



     (PROTONIX)      00:00:                               mouth           [place]



     40 mg EC      00                                 daily.           [facility]



     tablet                                                        [facility]

 

     losartan 25      0      Yes            25mg      Take 25 mg           

[facility]



     mg tablet      4-25                               by mouth           [facility]



               00:00:                               daily.           [place]



               00                                                [facility]



                                                                 [facility]

 

     budesonide-      0      Yes       [id number] 2{puff}      Inhale 2     

      [facility]



     formoteroL      4-25                               Puffs 2           [facility]



     (SYMBICORT)      00:00:                               (two)           [place]



     80-4.5      00                                 times           [facility]



     mcg/actuati                                         daily.           [facility]



     on inhaler                                                        

 

     lidocaine      0      Yes       [id number] 15mL      Take 15 mL        

   [facility]



     2% viscous      4-25                               by mouth           [facility]

f



     (LIDOCAINE      00:00:                               every 4           [facility]

[facility]



     VISCOUS) 2      00                                 (four)           [facility]



     % solution                                         hours as           [facility]

h



                                                  needed for           



                                                  Oral           



                                                  mucosal           



                                                  pain.           

 

     pantoprazol      0      Yes       [id number] 40mg      Take 1          

 [facility]



     e         4-25                               tablet by           [facility]



     (PROTONIX)      00:00:                               mouth           [place]



     40 mg EC      00                                 daily.           [facility]



     tablet                                                        [facility]

 

     losartan 25      0      Yes            25mg      Take 25 mg           

[facility]



     mg tablet      4-25                               by mouth           [facility]



               00:00:                               daily.           [place]



                                                               [facility]



                                                                 [facility]

 

     budesonide-      0      Yes       [id number] 2{puff}      Inhale 2     

      [facility]



     formoteroL      4-25                               Puffs 2           [facility]



     (SYMBICORT)      00:00:                               (two)           [place]



     80-4.5      00                                 times           [facility]



     mcg/actuati                                         daily.           [facility]



     on inhaler                                                        

 

     lidocaine      0      Yes       [id number] 15mL      Take 15 mL        

   [facility]



     2% viscous      4-25                               by mouth           [facility]

f



     (LIDOCAINE      00:00:                               every 4           [facility]

[facility]



     VISCOUS) 2      00                                 (four)           [facility]



     % solution                                         hours as           [facility]

h



                                                  needed for           



                                                  Oral           



                                                  mucosal           



                                                  pain.           

 

     pantoprazol      -0      Yes       [id number] 40mg      Take 1          

 [facility]



     e         4-25                               tablet by           [facility]



     (PROTONIX)      00:00:                               mouth           [place]



     40 mg EC      00                                 daily.           [facility]



     tablet                                                        [facility]

 

     losartan 25      0      Yes            25mg      Take 25 mg           

[facility]



     mg tablet      4-25                               by mouth           [facility]



               00:00:                               daily.           [place]



               00                                                [facility]



                                                                 [facility]

 

     budesonide-      0      Yes       [id number] 2{puff}      Inhale 2     

      [facility]



     formoteroL      4-25                               Puffs 2           [facility]



     (SYMBICORT)      00:00:                               (two)           [place]



     80-4.5      00                                 times           [facility]



     mcg/actuati                                         daily.           [facility]



     on inhaler                                                        

 

     lidocaine      -0      Yes       [id number] 15mL      Take 15 mL        

   [facility]



     2% viscous      4-25                               by mouth           [facility]

f



     (LIDOCAINE      00:00:                               every 4           [facility]

[facility]



     VISCOUS) 2      00                                 (four)           [facility]



     % solution                                         hours as           [facility]

h



                                                  needed for           



                                                  Oral           



                                                  mucosal           



                                                  pain.           

 

     pantoprazol      -0      Yes       [id number] 40mg      Take 1          

 [facility]



     e         4-25                               tablet by           [facility]



     (PROTONIX)      00:00:                               mouth           [place]



     40 mg EC      00                                 daily.           [facility]



     tablet                                                        [facility]

 

     losartan 25      0      Yes            25mg      Take 25 mg           

[facility]



     mg tablet      4-25                               by mouth           [facility]



               00:00:                               daily.           [place]



                                                               [facility]



                                                                 [facility]

 

     budesonide-            Yes       [id number] 2{puff}      Inhale 2     

      [facility]



     formoteroL      4-25                               Puffs 2           [facility]



     (SYMBICORT)      00:00:                               (two)           [place]



     80-4.5      00                                 times           [facility]



     mcg/actuati                                         daily.           [facility]



     on inhaler                                                        

 

     lidocaine      0      Yes       [id number] 15mL      Take 15 mL        

   [facility]



     2% viscous      4-25                               by mouth           [facility]

f



     (LIDOCAINE      00:00:                               every 4           [facility]

[facility]



     VISCOUS) 2      00                                 (four)           [facility]



     % solution                                         hours as           [facility]

h



                                                  needed for           



                                                  Oral           



                                                  mucosal           



                                                  pain.           

 

     pantoprazol      -0      Yes       [id number] 40mg      Take 1          

 [facility]



     e         4-25                               tablet by           [facility]



     (PROTONIX)      00:00:                               mouth           [place]



     40 mg EC      00                                 daily.           [facility]



     tablet                                                        [facility]

 

     losartan 25      0      Yes            25mg      Take 25 mg           

[facility]



     mg tablet      4-25                               by mouth           [facility]



               00:00:                               daily.           [place]



                                                               [facility]



                                                                 [facility]

 

     budesonide-      0      Yes       [id number] 2{puff}      Inhale 2     

      [facility]



     formoteroL      4-25                               Puffs 2           [facility]



     (SYMBICORT)      00:00:                               (two)           [place]



     80-4.5      00                                 times           [facility]



     mcg/actuati                                         daily.           [facility]



     on inhaler                                                        

 

     lidocaine      -0      Yes       [id number] 15mL      Take 15 mL        

   [facility]



     2% viscous      4-25                               by mouth           [facility]

f



     (LIDOCAINE      00:00:                               every 4           [facility]

[facility]



     VISCOUS) 2      00                                 (four)           [facility]



     % solution                                         hours as           [facility]

h



                                                  needed for           



                                                  Oral           



                                                  mucosal           



                                                  pain.           

 

     pantoprazol      -0      Yes       [id number] 40mg      Take 1          

 [facility]



     e         4-25                               tablet by           [facility]



     (PROTONIX)      00:00:                               mouth           [place]



     40 mg EC      00                                 daily.           [facility]



     tablet                                                        [facility]

 

     losartan 25            Yes            25mg      Take 25 mg           

[facility]



     mg tablet      4-25                               by mouth           [facility]



               00:00:                               daily.           [place]



               00                                                [facility]



                                                                 [facility]

 

     budesonide-            Yes       [id number] 2{puff}      Inhale 2     

      [facility]



     formoteroL      4-25                               Puffs 2           [facility]



     (SYMBICORT)      00:00:                               (two)           [place]



     80-4.5      00                                 times           [facility]



     mcg/actuati                                         daily.           [facility]



     on inhaler                                                        

 

     lidocaine            Yes       [id number] 15mL      Take 15 mL        

   [facility]



     2% viscous      4-25                               by mouth           [facility]

f



     (LIDOCAINE      00:00:                               every 4           [facility]

s



     VISCOUS) 2      00                                 (four)           [facility]



     % solution                                         hours as           [facility]

h



                                                  needed for           



                                                  Oral           



                                                  mucosal           



                                                  pain.           

 

     pantoprazol            Yes       [id number] 40mg      Take 1          

 [facility]



     e         4-25                               tablet by           [facility]



     (PROTONIX)      00:00:                               mouth           [place]



     40 mg EC      00                                 daily.           [facility]



     tablet                                                        [facility]

 

     losartan 25            Yes            25mg      Take 25 mg           

[facility]



     mg tablet      4-25                               by mouth           [facility]



               00:00:                               daily.           [place]



               00                                                [facility]



                                                                 [facility]

 

     budesonide-            Yes       [id number] 2{puff}      Inhale 2     

      [facility]



     formoteroL      4-25                               Puffs 2           [facility]



     (SYMBICORT)      00:00:                               (two)           [place]



     80-4.5      00                                 times           [facility]



     mcg/actuati                                         daily.           [facility]



     on inhaler                                                        

 

     lidocaine            Yes       [id number] 15mL      Take 15 mL        

   [facility]



     2% viscous      4-25                               by mouth           [facility]

f



     (LIDOCAINE      00:00:                               every 4           [place]

s



     VISCOUS) 2      00                                 (four)           [facility]



     % solution                                         hours as           [facility]

h



                                                  needed for           



                                                  Oral           



                                                  mucosal           



                                                  pain.           

 

     pantoprazol            Yes       [id number] 40mg      Take 1          

 [facility]



     e         4-25                               tablet by           [facility]



     (PROTONIX)      00:00:                               mouth           [place]



     40 mg EC      00                                 daily.           [facility]



     tablet                                                        [facility]

 

     losartan 25            Yes            25mg      Take 25 mg           

[facility]



     mg tablet      4-25                               by mouth           [facility]



               00:00:                               daily.           [place]



               00                                                [facility]



                                                                 [facility]

 

     budesonide-            Yes       [id number] 2{puff}      Inhale 2     

      [facility]



     formoteroL      4-25                               Puffs 2           [facility]



     (SYMBICORT)      00:00:                               (two)           [place]



     80-4.5      00                                 times           [facility]



     mcg/actuati                                         daily.           [facility]



     on inhaler                                                        

 

     lidocaine      -0      Yes       [id number] 15mL      Take 15 mL        

   [facility]



     2% viscous      4-25                               by mouth           [facility]

f



     (LIDOCAINE      00:00:                               every 4           [place]

[facility]



     VISCOUS) 2      00                                 (four)           [facility]



     % solution                                         hours as           [facility]

h



                                                  needed for           



                                                  Oral           



                                                  mucosal           



                                                  pain.           

 

     pantoprazol      -0      Yes       [id number] 40mg      Take 1          

 [facility]



     e         4-25                               tablet by           [facility]



     (PROTONIX)      00:00:                               mouth           [place]



     40 mg EC      00                                 daily.           [facility]



     tablet                                                        [facility]

 

     losartan 25      0      Yes            25mg      Take 25 mg           

[facility]



     mg tablet      4-25                               by mouth           [facility]



               00:00:                               daily.           [place]



               00                                                [facility]



                                                                 [facility]

 

     budesonide-            Yes       [id number] 2{puff}      Inhale 2     

      [facility]



     formoteroL      4-25                               Puffs 2           [facility]



     (SYMBICORT)      00:00:                               (two)           [place]



     80-4.5      00                                 times           [facility]



     mcg/actuati                                         daily.           [facility]



     on inhaler                                                        

 

     lidocaine      -0      Yes       [id number] 15mL      Take 15 mL        

   [facility]



     2% viscous      4-25                               by mouth           [facility]

f



     (LIDOCAINE      00:00:                               every 4           [place]

s



     VISCOUS) 2      00                                 (four)           [facility]



     % solution                                         hours as           [facility]

h



                                                  needed for           



                                                  Oral           



                                                  mucosal           



                                                  pain.           

 

     pantoprazol      0      Yes       [id number] 40mg      Take 1          

 [facility]



     e         4-25                               tablet by           [facility]



     (PROTONIX)      00:00:                               mouth           [place]



     40 mg EC      00                                 daily.           [facility]



     tablet                                                        [facility]

 

     losartan 25      0      Yes            25mg      Take 25 mg           

[facility]



     mg tablet      4-25                               by mouth           [facility]



               00:00:                               daily.           [place]



               00                                                [facility]



                                                                 [facility]

 

     famotidine      2022- No        [id number] 40mg      Take 1          

 [facility]



     40 mg      4-25 10-20                          tablet by           [facility]



     tablet      00:00: 00:00                          mouth at           [place]



               00   :00                           bedtime.           [facility]



                                                                 [facility]

 

     famotidine      -0 - No        [id number] 40mg      Take 1          

 [facility]



     40 mg      4-25 10-20                          tablet by           [facility]



     tablet      00:00: 00:00                          mouth at           [place]



               00   :00                           bedtime.           [facility]



                                                                 [facility]

 

     famotidine      -0 - No        [id number] 40mg      Take 1          

 [facility]



     40 mg      4-25 10-20                          tablet by           [facility]



     tablet      00:00: 00:00                          mouth at           [place]



               00   :00                           bedtime.           [facility]



                                                                 [facility]

 

     benzonatate      -0      Yes                      TAKE 1           [facility]

[facility]



     100 mg      4-06                               CAPSULE BY           [facility]



     capsule      00:00:                               MOUTH 3           [place]



                                                TIMES A           Medical



                                                  DAY AS           [facility]



                                                  NEEDED FOR           



                                                  COUGH           

 

     cetirizine      0      Yes                      TAKE 1           [facility]

[facility]



     10 mg      4-06                               TABLET (10           [facility]



     tablet      00:00:                               MG) BY           [place]



               00                                 MOUTH           Medical



                                                  DAILY.           [facility]

 

     benzonatate      0      Yes                      TAKE 1           [facility]

ers



     100 mg      4-06                               CAPSULE BY           [facility]



     capsule      00:00:                               MOUTH 3           [place]



                                                TIMES A           Medical



                                                  DAY AS           [facility]



                                                  NEEDED FOR           



                                                  COUGH           

 

     cetirizine      0      Yes                      TAKE 1           [facility]

[facility]



     10 mg      4-06                               TABLET (10           [facility]



     tablet      00:00:                               MG) BY           [place]



               00                                 MOUTH           Medical



                                                  DAILY.           [facility]

 

     benzonatate      -      Yes                      TAKE 1           [facility]

ers



     100 mg      4-06                               CAPSULE BY           [facility]



     capsule      00:00:                               MOUTH 3           [place]



                                                TIMES A           Medical



                                                  DAY AS           [facility]



                                                  NEEDED FOR           



                                                  COUGH           

 

     cetirizine      0      Yes                      TAKE 1           [facility]

[facility]



     10 mg      4-06                               TABLET (10           [facility]



     tablet      00:00:                               MG) BY           [place]



               00                                 MOUTH           Medical



                                                  DAILY.           [facility]

 

     benzonatate            Yes                      TAKE 1           [facility]

ers



     100 mg      4-06                               CAPSULE BY           [facility]



     capsule      00:00:                               MOUTH 3           [place]



                                                TIMES A           Medical



                                                  DAY AS           [facility]



                                                  NEEDED FOR           



                                                  COUGH           

 

     cetirizine      -0      Yes                      TAKE 1           [facility]

[facility]



     10 mg      4-06                               TABLET (10           [facility]



     tablet      00:00:                               MG) BY           [place]



               00                                 MOUTH           Medical



                                                  DAILY.           [facility]

 

     benzonatate      -0      Yes                      TAKE 1           [facility]

ers



     100 mg      4-06                               CAPSULE BY           [facility]



     capsule      00:00:                               MOUTH 3           [place]



                                                TIMES A           Medical



                                                  DAY AS           [facility]



                                                  NEEDED FOR           



                                                  COUGH           

 

     cetirizine      -0      Yes                      TAKE 1           [facility]

[facility]



     10 mg      4-06                               TABLET (10           [facility]



     tablet      00:00:                               MG) BY           [place]



               00                                 MOUTH           Medical



                                                  DAILY.           [facility]

 

     benzonatate      -0      Yes                      TAKE 1           [facility]

ers



     100 mg      4-06                               CAPSULE BY           [facility]



     capsule      00:00:                               MOUTH 3           [place]



                                                TIMES A           Medical



                                                  DAY AS           [facility]



                                                  NEEDED FOR           



                                                  COUGH           

 

     cetirizine      -0      Yes                      TAKE 1           [facility]

[facility]



     10 mg      4-06                               TABLET (10           [facility]



     tablet      00:00:                               MG) BY           [address]



                                                  DAILY.           [facility]

 

     benzonatate      -0      Yes                      TAKE 1           [facility]

ers



     100 mg      4-06                               CAPSULE BY           [facility]



     capsule      00:00:                               MOUTH 3           [place]



                                                TIMES A           Medical



                                                  DAY AS           [facility]



                                                  NEEDED FOR           



                                                  COUGH           

 

     cetirizine      -0      Yes                      TAKE 1           [facility]

[facility]



     10 mg      4-06                               TABLET (10           [facility]



     tablet      00:00:                               MG) BY           [address]



                                                  DAILY.           [facility]

 

     benzonatate      -0      Yes                      TAKE 1           [facility]

ers



     100 mg      4-06                               CAPSULE BY           [facility]



     capsule      00:00:                               MOUTH 3           [place]



                                                TIMES A           Medical



                                                  DAY AS           [facility]



                                                  NEEDED FOR           



                                                  COUGH           

 

     cetirizine      -0      Yes                      TAKE 1           [facility]

[facility]



     10 mg      4-06                               TABLET (10           [facility]



     tablet      00:00:                               MG) BY           [address]



                                                  DAILY.           [facility]

 

     benzonatate      -0      Yes                      TAKE 1           [facility]

ers



     100 mg      4-06                               CAPSULE BY           [facility]



     capsule      00:00:                               MOUTH 3           [place]



                                                TIMES A           Medical



                                                  DAY AS           [facility]



                                                  NEEDED FOR           



                                                  COUGH           

 

     cetirizine      -0      Yes                      TAKE 1           [facility]

[facility]



     10 mg      4-06                               TABLET (10           [facility]



     tablet      00:00:                               MG) BY           [address]



                                                  DAILY.           [facility]

 

     benzonatate      -0      Yes                      TAKE 1           [facility]

ers



     100 mg      4-06                               CAPSULE BY           [facility]



     capsule      00:00:                               MOUTH 3           [place]



                                                TIMES A           Medical



                                                  DAY AS           [facility]



                                                  NEEDED FOR           



                                                  COUGH           

 

     cetirizine      -0      Yes                      TAKE 1           [facility]

[facility]



     10 mg      4-06                               TABLET (10           [facility]



     tablet      00:00:                               MG) BY           [address]



                                                  DAILY.           [facility]

 

     benzonatate      -0      Yes                      TAKE 1           [facility]

ers



     100 mg      4-06                               CAPSULE BY           [facility]



     capsule      00:00:                               MOUTH 3           [place]



                                                TIMES A           Medical



                                                  DAY AS           [facility]



                                                  NEEDED FOR           



                                                  COUGH           

 

     cetirizine      -0      Yes                      TAKE 1           [facility]

[facility]



     10 mg      4-06                               TABLET (10           [facility]



     tablet      00:00:                               MG) BY           [address]



                                                  DAILY.           [facility]

 

     benzonatate      -0      Yes                      TAKE 1           [facility]

ers



     100 mg      4-06                               CAPSULE BY           [facility]



     capsule      00:00:                               MOUTH 3           [place]



                                                TIMES A           Medical



                                                  DAY AS           [facility]



                                                  NEEDED FOR           



                                                  COUGH           

 

     cetirizine      -0      Yes                      TAKE 1           [facility]

[facility]



     10 mg      4-06                               TABLET (10           [facility]



     tablet      00:00:                               MG) BY           [address]           Medical



                                                  DAILY.           [facility]

 

     benzonatate      -0      Yes                      TAKE 1           [facility]

ers



     100 mg      4-06                               CAPSULE BY           [facility]



     capsule      00:00:                               MOUTH 3           [place]



                                                TIMES A           Medical



                                                  DAY AS           [facility]



                                                  NEEDED FOR           



                                                  COUGH           

 

     cetirizine      -0      Yes                      TAKE 1           [facility]

[facility]



     10 mg      4-06                               TABLET (10           [facility]



     tablet      00:00:                               MG) BY           [place]



               00                                 MOUTH           Medical



                                                  DAILY.           [facility]

 

     benzonatate      -0      Yes                      TAKE 1           [facility]

ers



     100 mg      4-06                               CAPSULE BY           [facility]



     capsule      00:00:                               MOUTH 3           [place]



                                                TIMES A           Medical



                                                  DAY AS           [facility]



                                                  NEEDED FOR           



                                                  COUGH           

 

     cetirizine      -0      Yes                      TAKE 1           [facility]

[facility]



     10 mg      4-06                               TABLET (10           [facility]



     tablet      00:00:                               MG) BY           [place]



               00                                 MOUTH           Medical



                                                  DAILY.           [facility]

 

     benzonatate      -0      Yes                      TAKE 1           [facility]

ers



     100 mg      4-06                               CAPSULE BY           [facility]



     capsule      00:00:                               MOUTH 3           [place]



                                                TIMES A           Medical



                                                  DAY AS           [facility]



                                                  NEEDED FOR           



                                                  COUGH           

 

     cetirizine      -0      Yes                      TAKE 1           [facility]

[facility]



     10 mg      4-06                               TABLET (10           [facility]



     tablet      00:00:                               MG) BY           [address]           Medical



                                                  DAILY.           [facility]

 

     benzonatate      -0      Yes                      TAKE 1           [facility]

ers



     100 mg      4-06                               CAPSULE BY           [facility]



     capsule      00:00:                               MOUTH 3           [place]



                                                TIMES A           Medical



                                                  DAY AS           [facility]



                                                  NEEDED FOR           



                                                  COUGH           

 

     cetirizine      -0      Yes                      TAKE 1           [facility]

[facility]



     10 mg      4-06                               TABLET (10           [facility]



     tablet      00:00:                               MG) BY           [address]           Medical



                                                  DAILY.           [facility]

 

     benzonatate      -0      Yes                      TAKE 1           [facility]

ers



     100 mg      4-06                               CAPSULE BY           [facility]



     capsule      00:00:                               MOUTH 3           [place]



                                                TIMES A           Medical



                                                  DAY AS           [facility]



                                                  NEEDED FOR           



                                                  COUGH           

 

     cetirizine      -0      Yes                      TAKE 1           [facility]

[facility]



     10 mg      4-06                               TABLET (10           [facility]



     tablet      00:00:                               MG) BY           [place]



               00                                 MOUTH           Medical



                                                  DAILY.           [facility]

 

     benzonatate      -0      Yes                      TAKE 1           [facility]

ers



     100 mg      4-06                               CAPSULE BY           [facility]



     capsule      00:00:                               MOUTH 3           [place]



                                                TIMES A           Medical



                                                  DAY AS           [facility]



                                                  NEEDED FOR           



                                                  COUGH           

 

     cetirizine      -0      Yes                      TAKE 1           [facility]

rs



     10 mg      4-06                               TABLET (10           [facility]



     tablet      00:00:                               MG) BY           [place]



               00                                 MOUTH           Medical



                                                  DAILY.           [facility]

 

     benzonatate      -0      Yes                      TAKE 1           [facility]

ers



     100 mg      4-06                               CAPSULE BY           [facility]



     capsule      00:00:                               MOUTH 3           [place]



               00                                 TIMES A           Medical



                                                  DAY AS           [facility]



                                                  NEEDED FOR           



                                                  COUGH           

 

     cetirizine      -0      Yes                      TAKE 1           [facility]

rs



     10 mg      4-06                               TABLET (10           [facility]



     tablet      00:00:                               MG) BY           [address]           Medical



                                                  DAILY.           Branch

 

     benzonatate      -0      Yes                      TAKE 1           [facility]

ers



     100 mg      4-06                               CAPSULE BY           [facility]



     capsule      00:00:                               MOUTH 3           [place]



                                                TIMES A           Medical



                                                  DAY AS           [facility]



                                                  NEEDED FOR           



                                                  COUGH           

 

     cetirizine      0      Yes                      TAKE 1           [facility]

rs



     10 mg      4-06                               TABLET (10           [facility]



     tablet      00:00:                               MG) BY           [place]



               00                                 MOUTH           Medical



                                                  DAILY.           [facility]

 

     benzonatate            Yes                      TAKE 1           [facility]

ers



     100 mg      4-06                               CAPSULE BY           [facility]



     capsule      00:00:                               MOUTH 3           [place]



                                                TIMES A           Medical



                                                  DAY AS           [facility]



                                                  NEEDED FOR           



                                                  COUGH           

 

     cetirizine      0      Yes                      TAKE 1           [facility]

rs



     10 mg      4-06                               TABLET (10           [facility]



     tablet      00:00:                               MG) BY           [address]



                                                  DAILY.           Branch

 

     benzonatate            Yes                      TAKE 1           [facility]

ers



     100 mg      4-06                               CAPSULE BY           [facility]



     capsule      00:00:                               MOUTH 3           [place]



                                                TIMES A           Medical



                                                  DAY AS           [facility]



                                                  NEEDED FOR           



                                                  COUGH           

 

     cetirizine      0      Yes                      TAKE 1           [facility]

rs



     10 mg      4-06                               TABLET (10           [facility]



     tablet      00:00:                               MG) BY           [address]



                                                  DAILY.           Branch

 

     benzonatate      -      Yes                      TAKE 1           [facility]

ers



     100 mg      4-06                               CAPSULE BY           [facility]



     capsule      00:00:                               MOUTH 3           [place]



                                                TIMES A           Medical



                                                  DAY AS           [facility]



                                                  NEEDED FOR           



                                                  COUGH           

 

     cetirizine      -0      Yes                      TAKE 1           [facility]

rs



     10 mg      4-06                               TABLET (10           [facility]



     tablet      00:00:                               MG) BY           [address]           Medical



                                                  DAILY.           Branch

 

     benzonatate      -0      Yes                      TAKE 1           [facility]

ers



     100 mg      4-06                               CAPSULE BY           [facility]



     capsule      00:00:                               MOUTH 3           [place]



                                                TIMES A           Medical



                                                  DAY AS           [facility]



                                                  NEEDED FOR           



                                                  COUGH           

 

     cetirizine      -0      Yes                      TAKE 1           [facility]

rs



     10 mg      4-06                               TABLET (10           [facility]



     tablet      00:00:                               MG) BY           [address]           Medical



                                                  DAILY.           Branch

 

     benzonatate      -0      Yes                      TAKE 1           [facility]

ers



     100 mg      4-06                               CAPSULE BY           [facility]



     capsule      00:00:                               MOUTH 3           [place]



                                                TIMES A           Medical



                                                  DAY AS           [facility]



                                                  NEEDED FOR           



                                                  COUGH           

 

     cetirizine      -0      Yes                      TAKE 1           [facility]

rs



     10 mg      4-06                               TABLET (10           [facility]



     tablet      00:00:                               MG) BY           [place]



               00                                 MOUTH           Medical



                                                  DAILY.           [facility]

 

     benzonatate      -0      Yes                      TAKE 1           [facility]

ers



     100 mg      4-06                               CAPSULE BY           [facility]



     capsule      00:00:                               MOUTH 3           [place]



                                                TIMES A           Medical



                                                  DAY AS           [facility]



                                                  NEEDED FOR           



                                                  COUGH           

 

     cetirizine      -0      Yes                      TAKE 1           [facility]

rs



     10 mg      4-06                               TABLET (10           [facility]



     tablet      00:00:                               MG) BY           [address]           Medical



                                                  DAILY.           [facility]anch

 

     benzonatate      -0      Yes                      TAKE 1           [facility]

ers



     100 mg      4-06                               CAPSULE BY           [facility]



     capsule      00:00:                               MOUTH 3           [place]



                                                TIMES A           Medical



                                                  DAY AS           [facility]



                                                  NEEDED FOR           



                                                  COUGH           

 

     cetirizine      -0      Yes                      TAKE 1           [facility]

rs



     10 mg      4-06                               TABLET (10           [facility]



     tablet      00:00:                               MG) BY           [place]



               00                                 MOUTH           Medical



                                                  DAILY.           [facility]

 

     benzonatate      -0      Yes                      TAKE 1           [facility]

ers



     100 mg      4-06                               CAPSULE BY           [facility]



     capsule      00:00:                               MOUTH 3           [place]



                                                TIMES A           Medical



                                                  DAY AS           [facility]



                                                  NEEDED FOR           



                                                  COUGH           

 

     cetirizine      -0      Yes                      TAKE 1           [facility]

rs



     10 mg      4-06                               TABLET (10           [facility] of



     tablet      00:00:                               MG) BY           [address]



                                                  DAILY.           Branch

 

     benzonatate      -0      Yes                      TAKE 1           [facility]

ers



     100 mg      4-06                               CAPSULE BY           [facility]



     capsule      00:00:                               MOUTH 3           [place]



                                                TIMES A           Medical



                                                  DAY AS           [facility]



                                                  NEEDED FOR           



                                                  COUGH           

 

     cetirizine      -0      Yes                      TAKE 1           [facility]

rs



     10 mg      4-06                               TABLET (10           [facility] of



     tablet      00:00:                               MG) BY           [address]



                                                  DAILY.           Branch

 

     benzonatate      -0      Yes                      TAKE 1           [facility]

ers



     100 mg      4-06                               CAPSULE BY           [facility]



     capsule      00:00:                               MOUTH 3           [place]



                                                TIMES A           Medical



                                                  DAY AS           [facility]



                                                  NEEDED FOR           



                                                  COUGH           

 

     cetirizine      -0      Yes                      TAKE 1           [facility]

rs



     10 mg      4-06                               TABLET (10           [facility]



     tablet      00:00:                               MG) BY           [address]           Medical



                                                  DAILY.           Branch

 

     benzonatate      -0      Yes                      TAKE 1           [facility]

ers



     100 mg      4-06                               CAPSULE BY           [facility] of



     capsule      00:00:                               MOUTH 3           [place]



                                                TIMES A           Medical



                                                  DAY AS           [facility]



                                                  NEEDED FOR           



                                                  COUGH           

 

     cetirizine      -0      Yes                      TAKE 1           [facility]

rs



     10 mg      4-06                               TABLET (10           [facility]



     tablet      00:00:                               MG) BY           [place]



               00                                 MOUTH           Medical



                                                  DAILY.           [facility]anch

 

     lisinopriL-      2020      Yes       [id number] 1{tbl}      Take 1         

  [facility]



     hydrochloro      1-18                               tablet by           [facility]

 of



     thiazide      00:00:                               mouth           [place]



     10-12.5 mg      00                                 daily.           Medical



     per tablet                                                        [facility]

 

     Miscellaneo      2020      Yes       [id number]           I10 -           U

[facility]



     [facility]                                     Dispense           ity o

f



     Supply Kit      00:00:                               blood           [place]



               00                                 pressure           Medical



                                                  cuff (any           [facility]



                                                  brand),           



                                                  take BP at           



                                                  home BID           

 

     lisinopriL-      2020      Yes       [id number] 1{tbl}      Take 1         

  [facility]



     hydrochloro      1-18                               tablet by           ity

 of



     thiazide      00:00:                               mouth           [place]



     10-12.5 mg      00                                 daily.           Medical



     per tablet                                                        Branch

 

     Miscellaneo      2020      Yes       [id number]           I10 -           U

[facility]



     [facility]                                     Dispense           ity o

f



     Supply Kit      00:00:                               blood           [place]



               00                                 pressure           Medical



                                                  cuff (any           [facility]



                                                  brand),           



                                                  take BP at           



                                                  home BID           

 

     lisinopriL-      2020      Yes       [id number] 1{tbl}      Take 1         

  [facility]



     hydrochloro      1-18                               tablet by           ity

 of



     thiazide      00:00:                               mouth           [place]



     10-12.5 mg      00                                 daily.           Medical



     per tablet                                                        Branch

 

     Miscellaneo      2020      Yes       [id number]           I10 -           U

[facility]



     [facility]                                     Dispense           ity o

f



     Supply Kit      00:00:                               blood           [place]



               00                                 pressure           Medical



                                                  cuff (any           [facility]



                                                  brand),           



                                                  take BP at           



                                                  home BID           

 

     lisinopriL-      2020      Yes       [id number] 1{tbl}      Take 1         

  [facility]



     hydrochloro      1-18                               tablet by           ity

 of



     thiazide      00:00:                               mouth           [place]



     10-12.5 mg      00                                 daily.           Medical



     per tablet                                                        Branch

 

     Miscellaneo      2020      Yes       [id number]           I10 -           U

[facility]



     [facility]                                     Dispense           ity o

f



     Supply Kit      00:00:                               blood           [place]



               00                                 pressure           Medical



                                                  cuff (any           [facility]



                                                  brand),           



                                                  take BP at           



                                                  home BID           

 

     lisinopriL-      2020      Yes       [id number] 1{tbl}      Take 1         

  [facility]



     hydrochloro      1-18                               tablet by           ity

 of



     thiazide      00:00:                               mouth           [place]



     10-12.5 mg      00                                 daily.           Medical



     per tablet                                                        Branch

 

     Miscellaneo      2020      Yes       [id number]           I10 -           U

[facility]



     [facility]                                     Dispense           ity o

f



     Supply Kit      00:00:                               blood           [place]



               00                                 pressure           Medical



                                                  cuff (any           [facility]



                                                  brand),           



                                                  take BP at           



                                                  home BID           

 

     lisinopriL-      2020      Yes       [id number] 1{tbl}      Take 1         

  [facility]



     hydrochloro      1-18                               tablet by           ity

 of



     thiazide      00:00:                               mouth           [place]



     10-12.5 mg      00                                 daily.           Medical



     per tablet                                                        Branch

 

     Miscellaneo      -      Yes       [id number]           I10 -           U

[facility]



     [facility]                                     Dispense           ity o

f



     Supply Kit      00:00:                               blood           [place]



               00                                 pressure           Medical



                                                  cuff (any           [facility]



                                                  brand),           



                                                  take BP at           



                                                  home BID           

 

     lisinopriL-      2020      Yes       [id number] 1{tbl}      Take 1         

  [facility]



     hydrochloro      1-18                               tablet by           ity

 of



     thiazide      00:00:                               mouth           [place]



     10-12.5 mg      00                                 daily.           Medical



     per tablet                                                        Branch

 

     Miscellaneo      2020      Yes       [id number]           I10 -           U

[employer]



     [facility]                                     Dispense           ity o

f



     Supply Kit      00:00:                               blood           [place]



               00                                 pressure           Medical



                                                  cuff (any           [facility]



                                                  brand),           



                                                  take BP at           



                                                  home BID           

 

     lisinopriL-      2020      Yes       [id number] 1{tbl}      Take 1         

  [facility]



     hydrochloro      1-18                               tablet by           ity

 of



     thiazide      00:00:                               mouth           [place]



     10-12.5 mg      00                                 daily.           Medical



     per tablet                                                        Branch

 

     Miscellaneo      2020      Yes       [id number]           I10 -           U

[facility]



     [facility]                                     Dispense           ity o

f



     Supply Kit      00:00:                               blood           [place]



               00                                 pressure           Medical



                                                  cuff (any           [facility]



                                                  brand),           



                                                  take BP at           



                                                  home BID           

 

     lisinopriL-      2020      Yes       [id number] 1{tbl}      Take 1         

  [facility]



     hydrochloro      1-18                               tablet by           ity

 of



     thiazide      00:00:                               mouth           [place]



     10-12.5 mg      00                                 daily.           Medical



     per tablet                                                        Branch

 

     Miscellaneo      2020      Yes       [id number]           I10 -           U

[facility]



     [facility]                                     Dispense           ity o

f



     Supply Kit      00:00:                               blood           [place]



               00                                 pressure           Medical



                                                  cuff (any           [facility]



                                                  brand),           



                                                  take BP at           



                                                  home BID           

 

     lisinopriL-      2020      Yes       [id number] 1{tbl}      Take 1         

  [facility]



     hydrochloro      1-18                               tablet by           ity

 of



     thiazide      00:00:                               mouth           [place]



     10-12.5 mg      00                                 daily.           Medical



     per tablet                                                        [facility]

 

     Miscellaneo      2020      Yes       [id number]           I10 -           U

[facility]



     [facility]                                     Dispense           ity o

f



     Supply Kit      00:00:                               blood           [place]



               00                                 pressure           Medical



                                                  cuff (any           [facility]



                                                  brand),           



                                                  take BP at           



                                                  home BID           

 

     lisinopriL-      2020      Yes       [id number] 1{tbl}      Take 1         

  [facility]



     hydrochloro      1-18                               tablet by           ity

 of



     thiazide      00:00:                               mouth           [place]



     10-12.5 mg      00                                 daily.           Medical



     per tablet                                                        Br[facility]

 

     Miscellaneo      2020      Yes       [id number]           I10 -           U

[facility]



     [facility]                                     Dispense           ity o

f



     Supply Kit      00:00:                               blood           [place]



               00                                 pressure           Medical



                                                  cuff (any           [facility]



                                                  brand),           



                                                  take BP at           



                                                  home BID           

 

     lisinopriL-      2020      Yes       [id number] 1{tbl}      Take 1         

  [facility]



     hydrochloro      1-18                               tablet by           ity

 of



     thiazide      00:00:                               mouth           [place]



     10-12.5 mg      00                                 daily.           Medical



     per tablet                                                        Branch

 

     Miscellaneo      2020      Yes       [id number]           I10 -           U

[facility]



     [facility]                                     Dispense           ity o

f



     Supply Kit      00:00:                               blood           [place]



               00                                 pressure           Medical



                                                  cuff (any           Branch



                                                  brand),           



                                                  take BP at           



                                                  home BID           

 

     lisinopriL-      2020      Yes       [id number] 1{tbl}      Take 1         

  [facility]



     hydrochloro      1-18                               tablet by           ity

 of



     thiazide      00:00:                               mouth           [place]



     10-12.5 mg      00                                 daily.           Medical



     per tablet                                                        Branch

 

     Miscellaneo      2020      Yes       [id number]           I10 -           U

[facility]



     [facility]                                     Dispense           ity o

f



     Supply Kit      00:00:                               blood           [place]



               00                                 pressure           Medical



                                                  cuff (any           Branch



                                                  brand),           



                                                  take BP at           



                                                  home BID           

 

     lisinopriL-      2020      Yes       [id number] 1{tbl}      Take 1         

  [facility]



     hydrochloro      1-18                               tablet by           ity

 of



     thiazide      00:00:                               mouth           [place]



     10-12.5 mg      00                                 daily.           Medical



     per tablet                                                        Branch

 

     Miscellaneo      2020      Yes       [id number]           I10 -           U

[facility]



     [facility]                                     Dispense           ity o

f



     Supply Kit      00:00:                               blood           [place]



               00                                 pressure           Medical



                                                  cuff (any           Branch



                                                  brand),           



                                                  take BP at           



                                                  home BID           

 

     lisinopriL-      2020      Yes       [id number] 1{tbl}      Take 1         

  [facility]



     hydrochloro      1-18                               tablet by           ity

 of



     thiazide      00:00:                               mouth           [place]



     10-12.5 mg      00                                 daily.           Medical



     per tablet                                                        Branch

 

     Miscellaneo      2020      Yes       [id number]           I10 -           U

[facility]



     [facility]                                     Dispense           ity o

f



     Supply Kit      00:00:                               blood           [place]



               00                                 pressure           Medical



                                                  cuff (any           Branch



                                                  brand),           



                                                  take BP at           



                                                  home BID           

 

     lisinopriL-      2020      Yes       [id number] 1{tbl}      Take 1         

  [facility]



     hydrochloro      1-18                               tablet by           ity

 of



     thiazide      00:00:                               mouth           [place]



     10-12.5 mg      00                                 daily.           Medical



     per tablet                                                        Branch

 

     Miscellaneo      2020      Yes       [id number]           I10 -           U

[facility]



     [facility]                                     Dispense           ity o

f



     Supply Kit      00:00:                               blood           [place]



               00                                 pressure           Medical



                                                  cuff (any           Br[facility]



                                                  brand),           



                                                  take BP at           



                                                  home BID           

 

     lisinopriL-      2020      Yes       [id number] 1{tbl}      Take 1         

  [facility]



     hydrochloro      1-18                               tablet by           ity

 of



     thiazide      00:00:                               mouth           [place]



     10-12.5 mg      00                                 daily.           Medical



     per tablet                                                        Branch

 

     Miscellaneo      2020      Yes       [id number]           I10 -           U

[facility]



     [facility]                                     Dispense           ity o

f



     Supply Kit      00:00:                               blood           [place]



               00                                 pressure           Medical



                                                  cuff (any           [facility]



                                                  brand),           



                                                  take BP at           



                                                  home BID           

 

     lisinopriL-      2020      Yes       [id number] 1{tbl}      Take 1         

  [facility]



     hydrochloro      1-18                               tablet by           ity

 of



     thiazide      00:00:                               mouth           [place]



     10-12.5 mg      00                                 daily.           Medical



     per tablet                                                        Branch

 

     Miscellaneo      2020      Yes       [id number]           I10 -           U

[facility]



     [facility]                                     Dispense           ity o

f



     Supply Kit      00:00:                               blood           [place]



               00                                 pressure           Medical



                                                  cuff (any           [facility]



                                                  brand),           



                                                  take BP at           



                                                  home BID           

 

     lisinopriL-      2020      Yes       [id number] 1{tbl}      Take 1         

  [facility]



     hydrochloro      1-18                               tablet by           ity

 of



     thiazide      00:00:                               mouth           [place]



     10-12.5 mg      00                                 daily.           Medical



     per tablet                                                        Branch

 

     Miscellaneo      2020      Yes       [id number]           I10 -           U

[facility]



     [facility]                                     Dispense           ity o

f



     Supply Kit      00:00:                               blood           [place]



               00                                 pressure           Medical



                                                  cuff (any           [facility]



                                                  brand),           



                                                  take BP at           



                                                  home BID           

 

     lisinopriL-      2020      Yes       [id number] 1{tbl}      Take 1         

  [facility]



     hydrochloro      1-18                               tablet by           ity

 of



     thiazide      00:00:                               mouth           [place]



     10-12.5 mg      00                                 daily.           Medical



     per tablet                                                        Branch

 

     Miscellaneo      2020      Yes       [id number]           I10 -           U

[facility]



     [facility]                                     Dispense           ity o

f



     Supply Kit      00:00:                               blood           [place]



               00                                 pressure           Medical



                                                  cuff (any           [facility]



                                                  brand),           



                                                  take BP at           



                                                  home BID           

 

     lisinopriL-      2020      Yes       [id number] 1{tbl}      Take 1         

  [facility]



     hydrochloro      1-18                               tablet by           ity

 of



     thiazide      00:00:                               mouth           [place]



     10-12.5 mg      00                                 daily.           Medical



     per tablet                                                        Branch

 

     Miscellaneo      2020      Yes       [id number]           I10 -           U

[facility]



     [facility]                                     Dispense           ity o

f



     Supply Kit      00:00:                               blood           [place]



               00                                 pressure           Medical



                                                  cuff (any           [facility]



                                                  brand),           



                                                  take BP at           



                                                  home BID           

 

     lisinopriL-      2020      Yes       [id number] 1{tbl}      Take 1         

  [facility]



     hydrochloro      1-18                               tablet by           ity

 of



     thiazide      00:00:                               mouth           [place]



     10-12.5 mg      00                                 daily.           Medical



     per tablet                                                        Branch

 

     Miscellaneo      2020      Yes       [id number]           I10 -           U

[facility]



     [facility]                                     Dispense           ity o

f



     Supply Kit      00:00:                               blood           [place]



               00                                 pressure           Medical



                                                  cuff (any           [facility]



                                                  brand),           



                                                  take BP at           



                                                  home BID           

 

     lisinopriL-      2020      Yes       [id number] 1{tbl}      Take 1         

  [facility]



     hydrochloro      1-18                               tablet by           ity

 of



     thiazide      00:00:                               mouth           [place]



     10-12.5 mg      00                                 daily.           Medical



     per tablet                                                        Branch

 

     Miscellaneo      2020      Yes       [id number]           I10 -           U

[facility]



     [facility]                                     Dispense           ity o

f



     Supply Kit      00:00:                               blood           [place]



               00                                 pressure           Medical



                                                  cuff (any           [facility]



                                                  brand),           



                                                  take BP at           



                                                  home BID           

 

     lisinopriL-      2020      Yes       [id number] 1{tbl}      Take 1         

  [facility]



     hydrochloro      1-18                               tablet by           ity

 of



     thiazide      00:00:                               mouth           [place]



     10-12.5 mg      00                                 daily.           Medical



     per tablet                                                        Branch

 

     Miscellaneo      2020      Yes       [id number]           I10 -           U

[facility]



     [facility]                                     Dispense           ity o

f



     Supply Kit      00:00:                               blood           [place]



               00                                 pressure           Medical



                                                  cuff (any           [facility]



                                                  brand),           



                                                  take BP at           



                                                  home BID           

 

     lisinopriL-      2020      Yes       [id number] 1{tbl}      Take 1         

  [facility]



     hydrochloro      1-18                               tablet by           ity

 of



     thiazide      00:00:                               mouth           [place]



     10-12.5 mg      00                                 daily.           Medical



     per tablet                                                        Branch

 

     Miscellaneo      -      Yes       [id number]           I10 -           U

[facility]



     [facility]                                     Dispense           ity o

f



     Supply Kit      00:00:                               blood           [place]



               00                                 pressure           Medical



                                                  cuff (any           Branch



                                                  brand),           



                                                  take BP at           



                                                  home BID           

 

     lisinopriL-      2020      Yes       [id number] 1{tbl}      Take 1         

  [facility]



     hydrochloro      1-18                               tablet by           ity

 of



     thiazide      00:00:                               mouth           [place]



     10-12.5 mg      00                                 daily.           Medical



     per tablet                                                        Branch

 

     Miscellaneo      2020      Yes       [id number]           I10 -           U

[facility]



     [facility]                                     Dispense           ity o

f



     Supply Kit      00:00:                               blood           [place]



               00                                 pressure           Medical



                                                  cuff (any           [facility]



                                                  brand),           



                                                  take BP at           



                                                  home BID           

 

     lisinopriL-      2020      Yes       [id number] 1{tbl}      Take 1         

  [facility]



     hydrochloro      1-18                               tablet by           ity

 of



     thiazide      00:00:                               mouth           [place]



     10-12.5 mg      00                                 daily.           Medical



     per tablet                                                        Branch

 

     Miscellaneo      2020      Yes       [id number]           I10 -           U

[facility]



     [facility]                                     Dispense           ity o

f



     Supply Kit      00:00:                               blood           [place]



               00                                 pressure           Medical



                                                  cuff (any           [facility]



                                                  brand),           



                                                  take BP at           



                                                  home BID           

 

     lisinopriL-      2020      Yes       [id number] 1{tbl}      Take 1         

  [facility]



     hydrochloro      1-18                               tablet by           ity

 of



     thiazide      00:00:                               mouth           [place]



     10-12.5 mg      00                                 daily.           Medical



     per tablet                                                        Branch

 

     Miscellaneo      2020      Yes       [id number]           I10 -           U

[facility]



     [facility]                                     Dispense           ity o

f



     Supply Kit      00:00:                               blood           [place]



               00                                 pressure           Medical



                                                  cuff (any           [facility]



                                                  brand),           



                                                  take BP at           



                                                  home BID           

 

     lisinopriL-      2020      Yes       [id number] 1{tbl}      Take 1         

  [facility]



     hydrochloro      1-18                               tablet by           ity

 of



     thiazide      00:00:                               mouth           [place]



     10-12.5 mg      00                                 daily.           Medical



     per tablet                                                        Branch

 

     Miscellaneo      2020      Yes       [id number]           I10 -           U

[facility]



     [facility]                                     Dispense           ity o

f



     Supply Kit      00:00:                               blood           [place]



               00                                 pressure           Medical



                                                  cuff (any           [facility]



                                                  brand),           



                                                  take BP at           



                                                  home BID           

 

     lisinopriL-      2020      Yes       [id number] 1{tbl}      Take 1         

  [facility]



     hydrochloro      1-18                               tablet by           ity

 of



     thiazide      00:00:                               mouth           [place]



     10-12.5 mg      00                                 daily.           Medical



     per tablet                                                        Branch

 

     Miscellaneo      2020      Yes       [id number]           I10 -           U

[facility]



      Medical      1-18                               Dispense           ity o

f



     Supply Kit      00:00:                               blood           [place]



               00                                 pressure           Medical



                                                  cuff (any           [facility]



                                                  brand),           



                                                  take BP at           



                                                  home BID           

 

     lisinopriL-      2020      Yes       [id number] 1{tbl}      Take 1         

  [facility]



     hydrochloro      1-18                               tablet by           ity

 of



     thiazide      00:00:                               mouth           [place]



     10-12.5 mg      00                                 daily.           Medical



     per tablet                                                        Branch

 

     Miscellaneo      2020      Yes       [id number]           I10 -           U

[facility]



     [facility]      18                               Dispense           ity o

f



     Supply Kit      00:00:                               blood           [place]



               00                                 pressure           Medical



                                                  cuff (any           [facility]



                                                  brand),           



                                                  take BP at           



                                                  home BID           







Vital Signs







             Vital Name   Observation Time Observation Value Comments     Source

 

             Systolic blood 2023 13:17:00 139 mm[Hg]                [facility]

s[facility] of



             [facility]

 

             Diastolic blood 2023 13:17:00 72 mm[Hg]                 Unive

rsity of



             [facility]

 

             Heart rate   2023 13:17:00 55 /min                   [facility]

ty [facility]

 

             Body temperature 2023 13:17:00 36.56 Daphnie                 [facility]

 

             Respiratory rate 2023 13:17:00 18 /min                   [facility]

 

             Body height  2023 13:17:00 157.5 cm                  [facility]

ty [facility]

 

             Body weight  2023 13:17:00 86.138 kg                 [facility]i

ty [facility]

 

             BMI          2023 13:17:00 34.73 kg/m2               Universi

ty [facility]

 

             Systolic blood 2023 22:35:00 135 mm[Hg]                [facility]iver

sity of



             [facility]

 

             Diastolic blood 2023 22:35:00 71 mm[Hg]                 Unive

rsity of



             [facility]

 

             Heart rate   2023 22:34:00 62 /min                   [facility]i

ty [facility]

 

             Body height  2023 22:34:00 157.5 cm                  [facility]

ty [facility]

 

             Body weight  2023 22:34:00 87.998 kg                 [facility]

ty [facility]

 

             BMI          2023 22:34:00 35.48 kg/m2               [facility]

ty of



                                                                 [facility]



                                                                 [facility]

 

             Oxygen saturation in 2023 22:34:00 97 /min                   

[facility]



             Arterial blood by                                        [place] [facility]

[facility]



             Pulse oximetry                                        [facility]

 

             Systolic blood 2022 14:50:00 148 mm[Hg]                [facility]

sity of



             pressure                                            [facility]



                                                                 [facility]

 

             Diastolic blood 2022 14:50:00 73 mm[Hg]                 Unive

rsity of



             pressure                                            [facility]



                                                                 [facility]

 

             Heart rate   2022 14:50:00 60 /min                   [facility]

ty of



                                                                 [facility]



                                                                 [facility]

 

             Body temperature 2022 14:50:00 36.61 Daphnie                 Univ

[facility] of



                                                                 [facility]



                                                                 Branch

 

             Respiratory rate 2022 14:50:00 18 /min                   Univ

[facility]ity of



                                                                 [facility]



                                                                 Branch

 

             Body height  2022 14:50:00 157.5 cm                  Universi

ty of



                                                                 [facility]



                                                                 Branch

 

             Body weight  2022 14:50:00 88.542 kg                 Universi

ty of



                                                                 [facility]



                                                                 Branch

 

             BMI          2022 14:50:00 35.70 kg/m2               Universi

ty of



                                                                 [facility]



                                                                 Branch

 

             Oxygen saturation in 2022 14:50:00 98 /min                   

[facility]



             Arterial blood by                                        [facility]

[facility]



             Pulse oximetry                                        Branch

 

             Heart rate   2022 20:04:00 75 /min                   Universi

ty of



                                                                 [facility]



                                                                 [facility]anch

 

             Respiratory rate 2022 20:04:00 15 /min                   Univ

ers[facility] of



                                                                 [facility]



                                                                 Branch

 

             Oxygen saturation in 2022 20:04:00 95 /min                   

[facility]



             Arterial blood by                                        [facility]

[facility]



             Pulse oximetry                                        Branch

 

             Systolic blood 2022 20:02:00 164 mm[Hg]                Univer

sity of



             pressure                                            [facility]



                                                                 Branch

 

             Diastolic blood 2022 20:02:00 72 mm[Hg]                 Unive

rsity of



             pressure                                            [facility]



                                                                 Branch

 

             Body temperature 2022 17:59:00 37 Daphnie                    Univ

ers[facility] of



                                                                 [facility]



                                                                 Branch

 

             BMI          2022-10-27 16:39:00 36.08 kg/m2               Universi

ty of



                                                                 [facility]



                                                                 [facility]anch

 

             Body height  2022-10-27 16:39:00 157.5 cm                  Universi

ty of



                                                                 [facility]



                                                                 [facility]anch

 

             Body weight  2022-10-27 16:39:00 89.5 kg                   Universi

ty of



                                                                 [facility]



                                                                 [facility]anch

 

             Systolic blood 2022 18:50:00 170 mm[Hg]                Univer

sity of



             pressure                                            [facility]



                                                                 Branch

 

             Diastolic blood 2022 18:50:00 73 mm[Hg]                 Unive

rsity of



             pressure                                            [facility]



                                                                 Branch

 

             Heart rate   2022 18:50:00 73 /min                   Universi

ty of



                                                                 [facility]



                                                                 [facility]

 

             Respiratory rate 2022 18:50:00 16 /min                   Univ

ersity of



                                                                 [facility]



                                                                 [facility]anch

 

             Oxygen saturation in 2022 18:50:00 96 /min                   

[facility]



             Arterial blood by                                        [facility]

[facility]



             Pulse oximetry                                        Branch

 

             Body temperature 2022 17:59:00 37 Daphnie                    Univ

ers[facility] of



                                                                 [facility]



                                                                 [facility]anch

 

             Body height  2022-10-27 16:39:00 157.5 cm                  Universi

ty of



                                                                 [facility]



                                                                 [facility]anch

 

             Body weight  2022-10-27 16:39:00 89.5 kg                   Universi

ty of



                                                                 [facility]



                                                                 [facility]anch

 

             BMI          2022-10-27 16:39:00 36.08 kg/m2               Universi

ty of



                                                                 [facility]



                                                                 [facility]

 

             Systolic blood 2022 16:20:00 156 mm[Hg]                Univer

sity of



             pressure                                            [facility]



                                                                 [facility]anch

 

             Diastolic blood 2022 16:20:00 72 mm[Hg]                 Unive

rsity of



             pressure                                            [facility]



                                                                 [facility]anch

 

             Heart rate   2022 16:18:00 52 /min                   Universi

ty of



                                                                 [facility]



                                                                 [facility]

 

             Body temperature 2022 16:18:00 36.56 Daphnie                 Univ

ersity of



                                                                 [facility]



                                                                 [facility]

 

             Respiratory rate 2022 16:18:00 18 /min                   Univ

ers[facility] of



                                                                 [facility]



                                                                 [facility]anch

 

             Body height  2022 16:18:00 157.5 cm                  [facility]i

ty of



                                                                 [facility]



                                                                 [facility]anch

 

             Body weight  2022 16:18:00 88.542 kg                 Universi

ty of



                                                                 [facility]



                                                                 [facility]anch

 

             BMI          2022 16:18:00 35.70 kg/m2               Universi

ty of



                                                                 [facility]



                                                                 [facility]

 

             Oxygen saturation in 2022 16:18:00 98 /min                   

[facility]



             Arterial blood by                                        [facility]

[facility]



             Pulse oximetry                                        Branch

 

             Systolic blood 2022-10-25 21:35:00 171 mm[Hg]                Univer

sity of



             pressure                                            [facility]



                                                                 [facility]anch

 

             Diastolic blood 2022-10-25 21:35:00 82 mm[Hg]                 Unive

rsity of



             pressure                                            [facility]



                                                                 [facility]anch

 

             Heart rate   2022-10-25 21:35:00 65 /min                   Universi

ty of



                                                                 [facility]



                                                                 [facility]

 

             Respiratory rate 2022-10-25 21:34:00 16 /min                   Univ

ers[facility] of



                                                                 [facility]



                                                                 [facility]

 

             Body height  2022-10-25 21:34:00 157.5 cm                  Universi

ty of



                                                                 [facility]



                                                                 [facility]anch

 

             Body weight  2022-10-25 21:34:00 89.54 kg                  [facility]i

ty of



                                                                 [facility]



                                                                 [facility]

 

             BMI          2022-10-25 21:34:00 36.10 kg/m2               Universi

ty of



                                                                 [facility]



                                                                 [facility]

 

             Oxygen saturation in 2022-10-25 21:34:00 99 /min                   

[facility]



             Arterial blood by                                        [facility]

[facility]



             Pulse oximetry                                        Branch

 

             Systolic blood 2022-10-24 19:00:00 149 mm[Hg]                Univer

sity of



             pressure                                            [facility]



                                                                 [facility]

 

             Diastolic blood 2022-10-24 19:00:00 69 mm[Hg]                 Unive

rsity of



             pressure                                            [facility]



                                                                 [facility]anch

 

             Heart rate   2022-10-24 19:00:00 59 /min                   Universi

ty of



                                                                 [facility]



                                                                 [facility]

 

             Respiratory rate 2022-10-24 19:00:00 16 /min                   Univ

ers[facility] of



                                                                 [facility]



                                                                 [facility]

 

             Oxygen saturation in 2022-10-24 19:00:00 96 /min                   

[facility]



             Arterial blood by                                        [facility]

[facility]



             Pulse oximetry                                        Branch

 

             Body temperature 2022-10-24 18:28:00 36.83 Daphnie                 Univ

ers[facility] of



                                                                 [facility]



                                                                 [facility]

 

             Body height  2022-10-24 16:52:00 157.5 cm                  Universi

ty of



                                                                 [facility]



                                                                 [facility]

 

             Body weight  2022-10-24 16:52:00 89.1 kg                   Universi

ty of



                                                                 [facility]



                                                                 [facility]

 

             BMI          2022-10-24 16:52:00 35.93 kg/m2               Universi

ty of



                                                                 [facility]



                                                                 [facility]

 

             Body height  2022-10-24 16:52:00 157.5 cm                  [facility]i

ty of



                                                                 [facility]



                                                                 [facility]

 

             Body weight  2022-10-24 16:52:00 89.1 kg                   Universi

ty of



                                                                 [facility]



                                                                 [facility]

 

             BMI          2022-10-24 16:52:00 35.93 kg/m2               Universi

ty of



                                                                 [facility]



                                                                 [facility]

 

             Systolic blood 2022-10-24 16:10:00 160 mm[Hg]                Univer

sity of



             pressure                                            [facility]



                                                                 [facility]

 

             Diastolic blood 2022-10-24 16:10:00 69 mm[Hg]                 Unive

rsity of



             pressure                                            [facility]



                                                                 [facility]anch

 

             Heart rate   2022-10-24 16:10:00 64 /min                   Universi

ty of



                                                                 [facility]



                                                                 [facility]

 

             Body temperature 2022-10-24 16:10:00 36.5 Daphnie                  Univ

ers[facility] of



                                                                 [facility]



                                                                 [facility]

 

             Respiratory rate 2022-10-24 16:10:00 18 /min                   Univ

ers[facility] of



                                                                 [facility]



                                                                 [facility]

 

             Oxygen saturation in 2022-10-24 16:10:00 97 /min                   

[facility]



             Arterial blood by                                        [facility]

[facility]



             Pulse oximetry                                        Branch

 

             Systolic blood 2022 18:42:00 125 mm[Hg]                Univer

sity of



             pressure                                            [facility]



                                                                 [facility]

 

             Diastolic blood 2022 18:42:00 72 mm[Hg]                 Unive

rsity of



             pressure                                            [facility]



                                                                 [facility]anch

 

             Heart rate   2022 18:42:00 62 /min                   Univers[facility]

ty of



                                                                 [facility]



                                                                 [facility]

 

             Body temperature 2022 18:42:00 36.39 Daphnie                 Univ

ers[facility] of



                                                                 [facility]



                                                                 [facility]

 

             Respiratory rate 2022 18:42:00 18 /min                   Univ

ers[facility] of



                                                                 [facility]



                                                                 [facility]

 

             Body height  2022 18:42:00 157.5 cm                  [facility]

ty of



                                                                 [facility]



                                                                 [facility]

 

             Body weight  2022 18:42:00 87.907 kg                 [facility]

ty of



                                                                 [facility]



                                                                 [facility]

 

             BMI          2022 18:42:00 35.45 kg/m2               Universi

ty of



                                                                 [facility]



                                                                 [facility]

 

             Oxygen saturation in 2022 18:42:00 96 /min                   

[facility]



             Arterial blood by                                        [facility]

[facility]



             Pulse oximetry                                        Branch

 

             Systolic blood 2022-09-15 19:50:00 134 mm[Hg]                Univer

sity of



             pressure                                            [facility]



                                                                 [facility]anch

 

             Diastolic blood 2022-09-15 19:50:00 74 mm[Hg]                 Unive

rsity of



             pressure                                            [facility]



                                                                 [facility]anch

 

             Heart rate   2022-09-15 19:50:00 64 /min                   Universi

ty of



                                                                 [facility]



                                                                 [facility]

 

             Body temperature 2022-09-15 19:50:00 36.22 Daphnie                 Univ

ers[facility] of



                                                                 [facility]



                                                                 [facility]

 

             Respiratory rate 2022-09-15 19:50:00 18 /min                   Univ

ersity of



                                                                 [facility]



                                                                 [facility]

 

             Body height  2022-09-15 19:50:00 157.5 cm                  [facility]

ty of



                                                                 [facility]



                                                                 [facility]

 

             Body weight  2022-09-15 19:50:00 89.086 kg                 Universi

ty of



                                                                 [facility]

 

             BMI          2022-09-15 19:50:00 35.92 kg/m2               Univers

ty [facility]

 

             Oxygen saturation in 2022-09-15 19:50:00 97 /min                   

University 



             Arterial blood by                                        [facility]

[facility]



             Pulse oximetry                                        Branch







Procedures







                Procedure       Date / Time     Performing      Source



                                Performed       Clinician       

 

                ASSIGNMENT OF BENEFITS 2023      Doctor          [facility]

y of



                                12:43:48        Unassigned, No  [facility]



                                                Name            Branch

 

                FL TIME OR (NON-REPORTABLE) 2022      Heydi Knutson Uni

versity of



                                17:07:00                        [facility]

 

                FL TIME OR (NON-REPORTABLE) 2022      Heydi Knutson Uni

versity of



                                17:07:00                        [facility]

 

                CHOLECYSTECTOMY LAPAROSCOPY WITH 2022      Kandice Knutson [place] [facility]



                CHOLANGIOGRAM   15:22:00                        [facility] SURGERY - [facility] 2022      Doctor          [facility]



                                06:01:00        Unassigned, No  [facility]

 

                XR CHEST 2 VW   2022-10-25      Heydi Knutson [place] [facility]



                                21:10:55                        [facility]

 

                ESOPHAGOGASTRODUODENOSCOPY 2022-10-24      Heydi Knutson [facility]

ersity of



                                17:51:00                        [facility]

 

                EGD (ENDO)      2022-10-24      [facility]



                                17:34:02        O               [facility]

 

                EGD (ENDO)      2022-10-24      [facility] of



                                17:34:02        O               [facility] SURGERY - [facility] 2022-10-24      Doctor          [place]



                                05:01:00        Unassigned, No  [facility]



                                                Name            Branch

 

                INSURANCE CORRESPONDENCE 2022-10-15      Doctor          [facility]

ity of



                                05:01:00        Unassigned, No  [facility]



                                                Name            [place]

 

                REFERRAL- REQUEST/RESPONSE 2022      Doctor          Unive

rsity of



                                05:01:00        Unassigned, No  [facility]

 

                DISCLOSURE AND CONSENT, MEDICAL 2022      Doctor          

[place]



                AND SURGICAL PROCEDURES 05:01:00        Unassigned, No  [facility]

[facility]



                                                Name            Branch

 

                CT ABDOMEN PELVIS W CONTRAST 2022      Heydi Knutson 

iversity of



                                13:15:24                        [facility]

 

                INSURANCE CORRESPONDENCE 2022      Doctor          [facility]

ity of



                                05:01:00        Unassigned, No  [facility]



                                                Name            Branch







Plan of Care







             Planned Activity Planned Date Details      Comments     Source

 

             Future Scheduled 2023   Screening for              [facility]



             Test         19:07:30     malignant neoplasm              



                                       of colon                  



                                       (procedure) [code =              



                                       829478498]                

 

             Future Scheduled 2023   COVID-19 VACCINE              [facility]

 Hospital



             Test         19:07:30     (#1) [code =              



                                       COVID-19 VACCINE              



                                       (#1)]                     

 

             Future Scheduled 2023   Screening for              [facility]



             Test         19:07:30     malignant neoplasm              



                                       of cervix                 



                                       (procedure) [code =              



                                       187147681]                

 

             Future Scheduled 2023   BREAST CANCER              [facility]



             Test         19:07:30     SCREENING [code =              



                                       BREAST CANCER              



                                       SCREENING]                

 

             Future Scheduled 2023   Screening for              [facility]



             Test         19:07:30     malignant neoplasm              



                                       of colon                  



                                       (procedure) [code =              



                                       [id number]]                

 

             Future Scheduled 2023   Screening for              [facility]



             Test         19:07:30     malignant neoplasm              



                                       of colon                  



                                       (procedure) [code =              



                                       683626223]                

 

             Future Scheduled 2023   [facility]              [facility]



             Test         19:07:30     (1 of 2) [code =              



                                       SHINGLES VACCINES              



                                       (1 of 2)]                 

 

             Future Scheduled 2023   INFLUENZA VACCINE              [facility]



             Test         19:07:30     [code = INFLUENZA              



                                       VACCINE]                  

 

             Future Scheduled 2023   [facility]ening for              [facility]



             Test         19:07:30     malignant neoplasm              



                                       of colon                  



                                       (procedure) [code =              



                                       337425031]                

 

             Future Scheduled 2023   Screening for              [facility]



             Test         19:07:30     malignant neoplasm              



                                       of colon                  



                                       (procedure) [code =              



                                       533072002]                







Encounters







        Start   End     Encounter Admission Attending Care    Care    Encounter 

Source



        Date/Time Date/Time Type    Type    Clinicians Facility Department ID   

   

 

        [facility]    [id number]

559 [facility]



        13:00:00 13:00:00                 GLORY partida [facility]

 

        2023 Outpatient MONICA BLANCO,   [facility]    [id number]

547 Univers



        00:00:00 00:00:00                 GLORY partida [facility]

 

        2023 Outpatient                 MILI GRIGSBY  [id number]

26 Mili



        09:00:00 09:00:00                                                 Seybol

d

 

        2023 [facility]       [facility]    [id number]

231 Univers



        08:00:00 08:51:04                 GLORY partida [facility]

 

        2023 [facility]          [facility]    1.2.840.114 [id number]

08 Univers



        08:00:00 08:51:04 Visit           Glory SHER 350.1.13.10         

ity of



                                                [place] 4.2.7.2.686         [place]

[place]



                                                [place] [phone]         Me

dical



                                                NAL     134             [facility]                 

 

        2023 Orders          Doctor  ERICK    1.2.840.114 [id number]

[facility] Univers



        00:00:00 00:00:00 Only            Unassigned, MELISA   350.1.13.10       

  ity of



                                        [facility]"" 4.2.7.2.686         [employer]

as



                                                        [phone]         [address]

 

        2023 [facility] R       [facility]    [id number]

19085 Univers



        16:30:00 16:41:32                 HEYDI partida [facility]

 

        2023 Office          [facility]    1.2.030.017 1411

6213 Univers



        16:30:00 16:41:32 Visit           Heydi SHER 350.1.13.10         i

ty of



                                                [place] 4.2.7.2.686         [place]

[place]



                                                [place]ESS[place] [phone]         Me

dical



                                                NAL     188             [facility]                 

 

        2022 [facility]patient R       [facility]    [id number]

44958 Univers



        08:45:00 09:10:17                 HEYDI partida [facility]

 

        2022 Office          [facility]    1.2.298.081 [id number]

3367 Univers



        08:45:00 09:10:17 Visit           Heydi SHER 350.1.13.10         i

ty of



                                                [place] 4.2.7.2.686         [place]

[place]



                                                PROFESS[place] [phone]         Me

dical



                                                NAL     188             [facility]                 

 

        2022 Outpatient R       [facility]    ELISA     [id number]

43885 Univers



        08:00:00 14:21:00                 HEYDI partida [facility]

 

        2022 [facility]    1.2.840.114 [id number]

[id number] Univers



        08:00:00 14:21:00 Encounter         Heydi SHER 350.1.13.10        

 ity of



                                                [facility] 4.2.7.2.686         [place]

[place]



                                                SURGICAL [phone]         [facility]  [address]             [place]

 

        2022 Surgery         [facility]    1.2.961.212 [id number]

7317 Univers



        09:00:00 12:51:00                 Heydi PHANOSCAR 350.1.13.10         i

ty of



                                                [facility] 4.2.7.2.686         [place]

[place]



                                                SURGICAL [phone]         [facility]  [address]             [place]

 

        2022 [facility] R       [facility]    [id number]

75951 Univers



        09:30:00 10:41:04                 HEYDI partida [facility]

 

        2022 Office          [facility]    1.2.442.107 [id number]

7066 Univers



        09:30:00 10:41:04 Visit           Heydi PHANOSCAR 350.1.13.10         i

ty of



                                                [facility] 4.2.7.2.686         [place]

[place]



                                                PROFESSIO [phone]         Me

dic[address]                 

 

        2022-10-25 2022-10-25 Outpatient R       [facility]    [id number]

88617 Univers



        15:21:07 23:59:00                 HEYDI partida [facility]

 

        2022-10-25 2022-10-25 [facility]    1.2.840.114 [id number]

13838 Univers



        15:21:07 23:59:00 Encounter         Heydi PHANOSCAR 350.1.13.10        

 ity of



                                                [facility] 4.2.7.2.686         [place]

[place]



                                                CAMPUS  [phone]         Medi

nikos



                                                        [address]             [place]

 

        2022-10-25 2022-10-25 Office          [facility]    1.2.998.173 [id number]

4664 Univers



        16:15:00 16:55:22 Visit           Heydi SHER 350.1.13.10         i

ty of



                                                [place] 4.2.7.2.686         [place]

[place]



                                                PROFESSIO [phone]         Me

dical



                                                NAL     188             [place]



                                                [facility]                 

 

        2022-10-25 2022-10-25 Telephone         Eamon ERICK    1.2.840.114 [id number]

[id number] Univers



        00:00:00 00:00:00                 Heydi VINCENT   350.1.13.10         it

y of



                                                HOSPITAL 4.2.7.2.686         [employer]

as



                                                        [phone]         Medi

nikos



                                                        010             [place]

 

        2022-10-24 2022-10-24 Outpatient R       [facility]    ELISA     [id number]

78912 Univers



        11:03:00 14:10:00                 HEYDI                          mikenelson of



                                                                        [facility]

 

        2022-10-24 2022-10-24 [facility]    1.2.840.114 [id number]

[id number] Univers



        11:03:00 14:10:00 Encounter         Heydi SHER 350.1.13.10        

 ity of



                                                [place] 4.2.7.2.686         [place]

[place]



                                                SURGICAL [phone]         [facility]  [address]             [place]

 

        2022-10-24 2022-10-24 Surgery         [facility]    1.2.231.210 [id number]

5704 Univers



        12:13:00 12:49:00                 Heydi SHER 350.1.13.10         i

ty of



                                                [place] 4.2.7.2.686         [place]

[place]



                                                SURGICAL [phone]         [facility]  020             [place]

 

        2022-10-24 2022-10-24 Orders          Doctor  ERICK    1.2.840.114 [id number]

08 Univers



        00:00:00 00:00:00 Only            Unassigned, MELISA   350.1.13.10       

  ity of



                                        [facility] 4.2.7.2.686         [employer]

as



                                                        [phone]         Medi

nikos



                                                        009             [place]

 

        2022-10-20 2022-10-20 RULA Clayton 1.2.840.114 [id number]

[id number] Univers



        00:00:00 00:00:00                 Gerardo  Y       350.1.13.10         it

y of



                                                NATIONAL 4.2.7.2.686         [employer]

as



                                                BANK    [phone]         Medi

nikos



                                                BLDG.   144             [place]

 

        2022-10-15 2022-10-15 Orders          Doctor  ERICK    1.2.840.114 [id number]

92 Univers



        00:00:00 00:00:00 Only            Unassigned, MELISA   350.1.13.10       

  ity of



                                        [facility] 4.2.7.2.686         [employer]

as



                                                        [phone]         [address]

 

        2022 Telephone         [facility]    1.2.840.114 [id number]

[id number] Univers



        00:00:00 00:00:00                 Heydi SHER 350.1.13.10         i

ty of



                                                [place] 4.2.7.2.686         [place]

[place]



                                                PROFESSIO [phone]         Me

dical



                                                NAL     [address] [place] [address]                 

 

        2022 Orders          Doctor SUAREZ    1.2.840.114 [id number]

93 Univers



        00:00:00 00:00:00 Only            Unassigned, MELISA   350.1.13.10       

  ity of



                                        [facility] 4.2.7.2.686         [employer]

as



                                                        [phone]         [address]

 

        2022 Prep For         [facility]    1.2.840.114 [id number]

91856 Univers



        00:00:00 00:00:00 Surgery         Heydi SHER 350.1.13.10         i

ty of



                                                [place] 4.2.7.2.686         [place]

[place]



                                                PROFESSIO [phone]         Me

dical



                                                NAL     [address] [place] [address]                 

 

        2022 [facility] R       [facility]    06538

09518 Univers



        13:45:00 14:46:12                 HEYDI partida of



                                                                        [facility]

 

        2022 Office          [facility]    1.2.171.943 [id number]

8124 Univers



        13:45:00 14:46:12 Visit           Heydi SHER 350.1.13.10         i

ty of



                                                [place] 4.2.7.2.686         [place]

[place]



                                                PROFESSIO [phone]         Me

dical



                                                NAL     [address] [place] [address]                 

 

        2022 Orders          Doctor SUAREZ    1.2.840.114 [id number]

05 Univers



        00:00:00 00:00:00 Only            Unassigned, MELISA   350.1.13.10       

  ity of



                                        [facility] 4.2.7.2.686         [employer]

as



                                                        [phone]         [address]

 

        2022 [facility]patient R       [facility]    [id number]

15920 Univers



        07:39:21 23:59:00                 HEYDI                          nelson [facility]

 

        2022 Hospital         [facility]    1.2.840.114 [id number]

[id number] Univers



        07:39:21 23:59:00 Encounter         Heydi SHER 350.1.13.10        

 ity of



                                                [place] 4.2.7.2.686         [place]

[place]



                                                CAMPUS  [phone]         Medi

nikos



                                                        [address]             [place]

 

        2022 Orders          Doctor  ERICK    1.2.840.114 [id number]

20 Univers



        00:00:00 00:00:00 Only            Unassigned, MELISA   350.1.13.10       

  ity of



                                        [facility] 4.2.7.2.686         [employer]

as



                                                        [phone]         [address]

 

        2022 Telephone         [facility]    1.2.840.114 [id number]

[id number] Univers



        00:00:00 00:00:00                 Heydi SHER 350.1.13.10         i

ty of



                                                [place] 4.2.7.2.686         [place]

[place]



                                                PROFESSIO [phone]         Me

dical



                                                NAL     [address] [place] [address]                 

 

        2022-09-15 2022-09-15 Outpatient R       [facility]    [id number]

77772 Univers



        15:00:00 15:39:13                 HEYDI partida [facility]

 

        2022-09-15 2022-09-15 Office          [facility]    1.2.241.652 1136

7763 Univers



        15:00:00 15:39:13 Visit           Heydi SHER 350.1.13.10         i

ty of



                                                [place] 4.2.7.2.686         [place]

[place]



                                                PROFESSIO [phone]         Me

dical



                                                NAL     [address] [place] [address]                 

 

        2022 Emergency X       MORRICAL, [facility]    ERT     [id number]

1584 Univers



        11:26:00 15:08:00                 LIAM partida [facility]

 

        2022 Emergency         Morrical, [facility]    1.2.840.114 [id number]

[id number] Univers



        11:26:00 15:08:00                 Liam IQRA SHER 350.1.13.10        

 ity of



                                                [place] 4.2.7.2.686         [facility]  [phone]         [address]

 

        2022 Emergency X       AGARWAL, [facility]    ERT     [id number]

065 Univers



        15:44:00 18:26:00                 KAZ                         ity [facility]

 

        2022 Emergency         [facility]    1.2.840.114 [id number]

34555 Univers



        15:44:00 18:26:00                 Kaz CHRISTOS 350.1.13.10         i

ty of



                                                [place] 4.2.7.2.686         [facility]  [phone]         [address]

 

        2022 Orders          Doctor  ERICK    1.2.840.114 [id number]

02 Univers



        00:00:00 00:00:00 Only            Unassigned, MELISA   350.1.13.10       

  ity of



                                        [facility] 4.2.7.2.686         [employer]

as



                                                        [phone]         [address]

 

        2022 Orders          Doctor  ERICK    1.2.840.114 [id number]

65 Univers



        00:00:00 00:00:00 Only            Unassigned, MELISA   350.1.13.10       

  ity of



                                        [facility] 4.2.7.2.686         [employer]

as



                                                        [phone]         [address]

 

        2022 Office          [facility]    1.2.721.620 6414

4017 Univers



        13:15:00 14:19:46 Visit           Merna SHER 350.1.13.10         i

ty of



                                                [place] 4.2.7.2.686         [place]

[place]



                                                PROFESSIO [phone]         Me

dical



                                                [facility]     419             [facility]                 

 

        2022 Outpatient R       [facility]    [id number]

[id number] Univers



        13:15:00 14:19:46                 MERNA mckeony [facility]

 

        2022 Outpatient R       SOPHIA, [facility]    [id number]

44224 Univers



        13:15:00 13:15:00                 MERNAKLAUDIA partida [facility]

 

        2022 Telephone         Adum,   [facility]    1.2.332.115 [id number]

2935 Univers



        00:00:00 00:00:00                 Glory SHER 350.1.13.10         

ity of



                                                [facility] 4.2.7.2.686         [place]

[place]



                                                PROFESSIO [phone]         Me

dic[address]                 

 

        2022 Telephone         Adum,   [facility]    1.2.566.150 [id number]

3539 Univers



        00:00:00 00:00:00                 Glory SHER 350.1.13.10         

ity of



                                                [facility] 4.2.7.2.686         [place]

[place]



                                                PROFESSIO [phone]         Me

dical



                                                NAL     [address] [place] [address]                 

 

        2022 Orders          Doctor  ERICK    1.2.840.114 [id number]

69 Univers



        00:00:00 00:00:00 Only            Unassigned, MELISA   350.1.13.10       

  ity of



                                        [facility] 4.2.7.2.686         [employer]

as



                                                        [phone]         [address]

 

        2022 Telephone         Ad,   [facility]    1.2.709.868 [id number]

9387 Univers



        00:00:00 00:00:00                 Glory SHER 350.1.13.10         

ity of



                                                [facility] 4.2.7.2.686         [place]

[place]



                                                PROFESSIO [phone]         Me

dic[address]                 

 

        2022 Outpatient EL      ADUM,   [facility]   VITOR    [id number]

494 [facility]



        12:00:00 12:00:00                 GLORY Edward      [facility]

 

        2022 Case            Adum,   [facility]    1.2.840.114 [id number]

84 Univers



        00:00:00 00:00:00 Management         Glory SHER 350.1.13.10      

   ity of



                                                [facility] 4.2.7.2.686         [place]

[place]



                                                PROFESSIO [phone]         Me

dical



                                                NAL     134             [facility]                 

 

        2022 [facility]    1.2.965.757 [id number]

8971 Univers



        00:00:00 00:00:00                 Glory SHER 350.1.13.10         

ity of



                                                [facility] 4.2.7.2.686         [place]

[place]



                                                PROFESSIO [phone]         Me

dical



                                                NAL     134             [facility]                 

 

        2022 Office          EnmaRULA 1..840.114 [id number]

[id number] Univers



        13:45:00 14:40:52 Visit           Gerardo GARCIA       350.1.13.10         it

y of



                                                [facility]ATIONAL 4.2.7.2.686         [employer]

as



                                                BANK    [phone]         Medi

nikos



                                                BLDG.   [address]             [place]

 

        2022 [facility] R       [facility]    [id number]

8200 Univers



        13:45:00 14:40:52                 GERARDO                          itnelson [facility]

 

        2022 [facility]patient R       [facility]    [id number]

8200 Univers



        13:45:00 13:45:00                 GERARDO                          ity [facility]

 

        2022 Outpatient R       [facility]    [id number]

851 Univers



        15:35:39 23:59:00                 GLORY                          itnelson [facility]

 

        2022 Outpatient R       [facility]    [id number]

851 Univers



        15:35:39 23:59:00                 GLORY                          itnelson [facility]

 

        2022 [facility]    1.2.840.114 [id number]

462 Univers



        15:20:00 23:59:00 Encounter         Glory SHER 350.1.13.10       

  ity of



                                                [place] 4.2.7.2.686         [place]

[place]



                                                CAMPUS  [phone]         Medi

nikos



                                                        [address]             [place]

 

        2022 Outpatient R       [facility]    [id number]

985 Univers



        08:00:00 09:00:58                 GLORY                          itnelson [facility]

 

        202229 Office          Adum,   [facility]    1.2.840.114 [id number]

68 Univers



        08:00:00 09:00:58 Visit           Glory SHER 350.1.13.10         

ity of



                                                [place] 4.2.7.2.686         [place]

[place]



                                                PROFESSIO [phone]         Me

dical



                                                NAL     134             [facility]                 

 

        2022 Letter          Adum,   [facility]    1.2.840.114 [id number]

43 Univers



        00:00:00 00:00:00 (Out)           Glory SHER 350.1.13.10         

ity of



                                                [place] 4.2.7.2.686         [place]

[place]



                                                PROFESSIO [phone]         Me

dic[address]                 

 

        2022 Outpatient R       [facility]    [id number]

95681 Univers



        18:05:23 23:59:00                                                 ity of



                                                                        [facility]

 

        2022 Hospital         [facility]    1.2.840.114 [id number]

[id number] Univers



        18:05:23 23:59:00 Encounter                 CHRISTOS 350.1.13.10        

 ity of



                                                [place] 4.2.7.2.686         [place]

[place]



                                                CAMPUS  [phone]         Medi

nikos



                                                        [address]             [place]

 

        2022 Orders          Doctor  ERICK    1.2.840.114 [id number]

30 Univers



        00:00:00 00:00:00 Only            Unassigned, MELISA   350.1.13.10       

  ity of



                                        [place] [facility]\"" 4.2.7.2.686         [employer]

as



                                                        [phone]         Medi

nikos



                                                        [address]             [place]

 

        2021-10-13 2021-10-13 Outpatient R       AD,   [facility]    [id number]

581 Univers



        15:30:00 15:30:00                 GLORY                          ity [facility]

 

        2021 Outpatient R       ADUM,   [facility]    [id number]

682 Univers



        15:00:00 15:00:00                 GLORY                          ity [facility]

 

        2021 Outpatient R               [facility]    [id number]

098 Univers



        13:00:00 13:00:00                                                 ity of



                                                                        [facility]

 

        2021 Urgent          Provider, [facility]    

1.2.840.114 

[id number]                                Univers



        11:39:35 12:54:45 Care            Shirlene Zhang  350.1.13.10  

       ity of



                                                [place] 4.2.7.2.686         [employer]

as



                                                Professio [phone]         [address]



                                                Office                  



                                                Building                 



                                                One                     

 

        2021 Outpatient R               [facility]    [id number]

399 Univers



        12:00:00 12:00:00                                                 ity of



                                                                        [facility]

 

        2021 Telephone         [facility]    1.2.840.114 [id number]

[id number] Univers



        00:00:00 00:00:00                 Colton Sher 350.1.13.10         i

ty of



                                                [place] 4.2.7.2.686         [place]

[place]



                                                Profess[place] [phone]         [address]                 

 

        2021 Telephone         [facility]    1.2.840.114 [id number]

[id number] Univers



        00:00:00 00:00:00                 Colton Sher 350.1.13.10         i

ty of



                                                [place] 4.2.7.2.686         [place]

[place]



                                                Professio [phone]         [address]                 

 

        2021 Outpatient R               [facility]    [id number]

348 Univers



        08:00:00 08:00:00                                                 ity [facility]

 

        2021 Technician         2, [facility]    1.2.840.114 

[id number] Univers



        07:38:52 07:53:52 Visit           Colton Mooney 350.1.13.10 

        ity of



                                                [place] 4.2.7.2.686         [place]

[place]



                                                Professio [phone]         [facility]     353             [facility]                 

 

        2021 Patient         Tomas  [facility]    1.2.840.114 [id number]

08 Univers



        00:00:00 00:00:00 Outreach         Orlando EUCEDA 350.1.13.10         i

ty of



                                        Yoav KHAN    4.2.7.2.686         [place]

[place]



                                                PAVILLION [phone]         Me

dical



                                                        [address] [place] [address]

 

        2021 [facility] R       [facility]    1029

[id number] Univers



        14:40:00 14:40:00                 COLTON partida [facility]

 

        2020 [facility] R       [facility]    1028

[id number] Univers



        10:00:00 10:00:00                 COLTON partida [facility]

 

        2020 [facility]patient R       [facility]    1029

[id number] Univers



        10:00:00 10:00:00                 COLTON                           nelson [facility]

 

        2020 Office          [facility]    1.2.840.114 [id number]

[id number] Univers



        15:33:45 16:21:04 Visit           Colton Sher 350.1.13.10         i

ty [facility] 4.2.7.2.686         [place]

[place]



                                                Professio [phone]         Me

dical



                                                [address]                 

 

        2020 Outpatient R       [facility]    1029

[id number] Univers



        15:00:00 15:00:00                 COLTON partida [facility]

 

        2020-10-29 2020-10-29 [facility]patient R       [facility]    [id number]

[id number] Univers



        15:00:00 15:00:00                 HEYDI partida [facility]

 

        2020-10-07 2020-10-08 Office          [facility]    1.2.840.114 [id number]

62 Univers



        15:06:36 12:37:09 Visit           Glory Sher 350.1.13.10         

ity [facility] 4.2.7.2.686         [place]

[place]



                                                Professio [phone]         Me

dical



                                                [facility]     [address]             [facility]                 

 

        2020-10-07 2020-10-08 Office          [facility]    1.2.840.114 [id number]

62 



        15:06:36 12:37:09 Visit           Glory Sher 350.1.13.10         



                                                [place] 4.2.7.2.686         



                                                Professio [phone]         



                                                [address]                 

 

        2020-10-07 2020-10-07 Outpatient R       [facility]    [id number]

621 Univers



        15:30:00 15:30:00                 GLORY                          ity of



                                                                        [facility]

 

        2020-10-07 2020-10-07 Orders          Doctor  ERICK    1.2.840.114 [id number]

34 Univers



        00:00:00 00:00:00 Only            Unassigned, MELISA   350.1.13.10       

  ity of



                                        [facility] 4.2.7.2.686         [employer]

as



                                                        [phone]         [address]

 

        2020 Refill          [facility]    1.2.840.114 [id number]

60 Univers



        00:00:00 00:00:00                 Shirlene [facility]  350.1.13.10         i

ty of



                                                [place] 4.2.7.2.686         [employer]

as



                                                Professio [phone]         [address]



                                                Office                  



                                                Building                 



                                                One                     

 

        2020 Telephone         [facility]    1.2.973.374 [id number]

2475 Univers



        00:00:00 00:00:00                 Shirlene [facility]  350.1.13.10         i

ty of



                                                [place] 4.2.7.2.686         [employer]

as



                                                Professio [phone]         [address]



                                                Office                  



                                                Building                 



                                                One                     

 

        2020 Letter          Lab, Pcp [facility]    1.2.840.114 [id number]

539 Univers



        00:00:00 00:00:00 (Out)           [facility]   [facility]  350.1.13.10         it

y of



                                                [place] 4.2.7.2.686         [employer]

as



                                                Professio [phone]         [address]



                                                Office                  



                                                Building                 



                                                One                     

 

        2020 Urgent          Pob1, [facility]    1.

2.840.114 [id number] 

Univers



        15:02:08 16:04:22 Care            [facility]  350.1.13.10    

     ity of



                                                [place] 4.2.7.2.686         [employer]

as



                                                Professio [phone]         [address]



                                                Office                  



                                                Building                 



                                                One                     

 

        2020 Outpatient R               [facility]    [id number]

219 Univers



        15:00:00 15:00:00                                                 ity of



                                                                        [facility]

 

        2020 Outpatient R       JEANMARIE  [facility]    [id number]

589 Univers



        07:20:00 07:20:00                 TAMI partida of



                                                                        [facility]

 

        2020 Letter          Lab, Pcp [facility]    1.2.840.114 [id number]

175 Univers



        00:00:00 00:00:00 (Out)           Venturocket  350.1.13.10         it

y of



                                                [place] 4.2.7.2.686         [employer]

as



                                                Andra [phone]         Me

dical



                                                [facility]     [address]             [address]



                                                Office                  



                                                Building                 



                                                One                     







Results

This patient has no known results.